# Patient Record
Sex: FEMALE | Employment: UNEMPLOYED | URBAN - METROPOLITAN AREA
[De-identification: names, ages, dates, MRNs, and addresses within clinical notes are randomized per-mention and may not be internally consistent; named-entity substitution may affect disease eponyms.]

---

## 2017-02-09 ENCOUNTER — OFFICE VISIT (OUTPATIENT)
Dept: INTERNAL MEDICINE CLINIC | Age: 77
End: 2017-02-09

## 2017-02-09 VITALS
HEART RATE: 64 BPM | HEIGHT: 62 IN | SYSTOLIC BLOOD PRESSURE: 128 MMHG | DIASTOLIC BLOOD PRESSURE: 64 MMHG | WEIGHT: 128.2 LBS | BODY MASS INDEX: 23.59 KG/M2

## 2017-02-09 DIAGNOSIS — Z00.00 ROUTINE GENERAL MEDICAL EXAMINATION AT A HEALTH CARE FACILITY: ICD-10-CM

## 2017-02-09 DIAGNOSIS — Z01.89 PATIENT REQUEST FOR DIAGNOSTIC TESTING: ICD-10-CM

## 2017-02-09 DIAGNOSIS — Z00.00 WELLNESS EXAMINATION: Primary | ICD-10-CM

## 2017-02-09 DIAGNOSIS — Z78.0 ASYMPTOMATIC MENOPAUSAL STATE: ICD-10-CM

## 2017-02-09 LAB
A/G RATIO: 1.9 (ref 1.1–2.2)
ALBUMIN SERPL-MCNC: 4.3 G/DL (ref 3.4–5)
ALP BLD-CCNC: 93 U/L (ref 40–129)
ALT SERPL-CCNC: 21 U/L (ref 10–40)
ANION GAP SERPL CALCULATED.3IONS-SCNC: 12 MMOL/L (ref 3–16)
AST SERPL-CCNC: 22 U/L (ref 15–37)
BILIRUB SERPL-MCNC: 0.4 MG/DL (ref 0–1)
BUN BLDV-MCNC: 21 MG/DL (ref 7–20)
CALCIUM SERPL-MCNC: 9.6 MG/DL (ref 8.3–10.6)
CHLORIDE BLD-SCNC: 104 MMOL/L (ref 99–110)
CO2: 26 MMOL/L (ref 21–32)
CREAT SERPL-MCNC: 0.7 MG/DL (ref 0.6–1.2)
GFR AFRICAN AMERICAN: >60
GFR NON-AFRICAN AMERICAN: >60
GLOBULIN: 2.3 G/DL
GLUCOSE BLD-MCNC: 104 MG/DL (ref 70–99)
HCT VFR BLD CALC: 42.9 % (ref 36–48)
HEMOGLOBIN: 14.1 G/DL (ref 12–16)
MCH RBC QN AUTO: 33.9 PG (ref 26–34)
MCHC RBC AUTO-ENTMCNC: 32.8 G/DL (ref 31–36)
MCV RBC AUTO: 103.2 FL (ref 80–100)
PDW BLD-RTO: 13.3 % (ref 12.4–15.4)
PLATELET # BLD: 211 K/UL (ref 135–450)
PMV BLD AUTO: 7.2 FL (ref 5–10.5)
POTASSIUM SERPL-SCNC: 4.3 MMOL/L (ref 3.5–5.1)
RBC # BLD: 4.16 M/UL (ref 4–5.2)
SODIUM BLD-SCNC: 142 MMOL/L (ref 136–145)
TOTAL PROTEIN: 6.6 G/DL (ref 6.4–8.2)
VITAMIN D 25-HYDROXY: 58.2 NG/ML
WBC # BLD: 4.1 K/UL (ref 4–11)

## 2017-02-09 PROCEDURE — G0438 PPPS, INITIAL VISIT: HCPCS | Performed by: INTERNAL MEDICINE

## 2017-02-09 ASSESSMENT — LIFESTYLE VARIABLES
HOW MANY STANDARD DRINKS CONTAINING ALCOHOL DO YOU HAVE ON A TYPICAL DAY: 0
HOW OFTEN DO YOU HAVE SIX OR MORE DRINKS ON ONE OCCASION: 0
AUDIT-C TOTAL SCORE: 1
HOW OFTEN DO YOU HAVE A DRINK CONTAINING ALCOHOL: 1

## 2017-02-09 ASSESSMENT — ANXIETY QUESTIONNAIRES: GAD7 TOTAL SCORE: 0

## 2017-02-09 ASSESSMENT — PATIENT HEALTH QUESTIONNAIRE - PHQ9: SUM OF ALL RESPONSES TO PHQ QUESTIONS 1-9: 0

## 2017-02-15 ENCOUNTER — HOSPITAL ENCOUNTER (OUTPATIENT)
Dept: GENERAL RADIOLOGY | Age: 77
Discharge: HOME | End: 2017-02-15
Attending: INTERNAL MEDICINE | Admitting: INTERNAL MEDICINE

## 2017-02-15 DIAGNOSIS — Z78.0 ASYMPTOMATIC MENOPAUSAL STATE: ICD-10-CM

## 2018-01-23 ENCOUNTER — OFFICE VISIT (OUTPATIENT)
Dept: INTERNAL MEDICINE CLINIC | Age: 78
End: 2018-01-23

## 2018-01-23 VITALS
SYSTOLIC BLOOD PRESSURE: 124 MMHG | WEIGHT: 122 LBS | HEART RATE: 68 BPM | HEIGHT: 62 IN | BODY MASS INDEX: 22.45 KG/M2 | TEMPERATURE: 97.7 F | DIASTOLIC BLOOD PRESSURE: 76 MMHG

## 2018-01-23 DIAGNOSIS — R21 RASH: ICD-10-CM

## 2018-01-23 DIAGNOSIS — R10.32 LEFT GROIN PAIN: Primary | ICD-10-CM

## 2018-01-23 LAB
BUN BLDV-MCNC: 14 MG/DL (ref 7–20)
CREAT SERPL-MCNC: 0.8 MG/DL (ref 0.6–1.2)
GFR AFRICAN AMERICAN: >60
GFR NON-AFRICAN AMERICAN: >60

## 2018-01-23 PROCEDURE — 1036F TOBACCO NON-USER: CPT | Performed by: INTERNAL MEDICINE

## 2018-01-23 PROCEDURE — 1123F ACP DISCUSS/DSCN MKR DOCD: CPT | Performed by: INTERNAL MEDICINE

## 2018-01-23 PROCEDURE — 1090F PRES/ABSN URINE INCON ASSESS: CPT | Performed by: INTERNAL MEDICINE

## 2018-01-23 PROCEDURE — G8484 FLU IMMUNIZE NO ADMIN: HCPCS | Performed by: INTERNAL MEDICINE

## 2018-01-23 PROCEDURE — 4040F PNEUMOC VAC/ADMIN/RCVD: CPT | Performed by: INTERNAL MEDICINE

## 2018-01-23 PROCEDURE — G8399 PT W/DXA RESULTS DOCUMENT: HCPCS | Performed by: INTERNAL MEDICINE

## 2018-01-23 PROCEDURE — 99214 OFFICE O/P EST MOD 30 MIN: CPT | Performed by: INTERNAL MEDICINE

## 2018-01-23 PROCEDURE — G8427 DOCREV CUR MEDS BY ELIG CLIN: HCPCS | Performed by: INTERNAL MEDICINE

## 2018-01-23 PROCEDURE — G8420 CALC BMI NORM PARAMETERS: HCPCS | Performed by: INTERNAL MEDICINE

## 2018-01-23 NOTE — PROGRESS NOTES
accurate and complete. I agree with the Chief Complaint, ROS, and Past Histories independently gathered by the clinical support staff and the remaining scribed note accurately describes my personal service to the patient.     1/23/2018    11:41 AM

## 2018-01-26 ENCOUNTER — TELEPHONE (OUTPATIENT)
Dept: INTERNAL MEDICINE CLINIC | Age: 78
End: 2018-01-26

## 2018-01-26 ENCOUNTER — HOSPITAL ENCOUNTER (OUTPATIENT)
Dept: CT IMAGING | Age: 78
Discharge: HOME | End: 2018-01-26
Attending: INTERNAL MEDICINE | Admitting: INTERNAL MEDICINE

## 2018-01-26 DIAGNOSIS — R10.32 LEFT GROIN PAIN: ICD-10-CM

## 2018-01-26 DIAGNOSIS — R10.32 LEFT LOWER QUADRANT PAIN: ICD-10-CM

## 2018-01-26 DIAGNOSIS — K41.90 FEMORAL HERNIA OF LEFT SIDE: Primary | ICD-10-CM

## 2018-02-05 ENCOUNTER — OFFICE VISIT (OUTPATIENT)
Dept: SURGERY | Age: 78
End: 2018-02-05

## 2018-02-05 VITALS
BODY MASS INDEX: 22.5 KG/M2 | WEIGHT: 127 LBS | HEIGHT: 63 IN | DIASTOLIC BLOOD PRESSURE: 70 MMHG | SYSTOLIC BLOOD PRESSURE: 120 MMHG

## 2018-02-05 DIAGNOSIS — K41.90 FEMORAL HERNIA OF LEFT SIDE: Primary | ICD-10-CM

## 2018-02-05 PROCEDURE — G8420 CALC BMI NORM PARAMETERS: HCPCS | Performed by: SURGERY

## 2018-02-05 PROCEDURE — 99203 OFFICE O/P NEW LOW 30 MIN: CPT | Performed by: SURGERY

## 2018-02-05 PROCEDURE — G8427 DOCREV CUR MEDS BY ELIG CLIN: HCPCS | Performed by: SURGERY

## 2018-02-05 PROCEDURE — 4040F PNEUMOC VAC/ADMIN/RCVD: CPT | Performed by: SURGERY

## 2018-02-05 PROCEDURE — G8484 FLU IMMUNIZE NO ADMIN: HCPCS | Performed by: SURGERY

## 2018-02-05 PROCEDURE — 1090F PRES/ABSN URINE INCON ASSESS: CPT | Performed by: SURGERY

## 2018-02-05 ASSESSMENT — ENCOUNTER SYMPTOMS
SINUS PRESSURE: 1
GASTROINTESTINAL NEGATIVE: 1
ALLERGIC/IMMUNOLOGIC NEGATIVE: 1
RESPIRATORY NEGATIVE: 1

## 2018-02-27 ENCOUNTER — HOSPITAL ENCOUNTER (OUTPATIENT)
Dept: SURGERY | Age: 78
Discharge: HOME | End: 2018-02-27
Admitting: SURGERY

## 2018-02-27 VITALS
WEIGHT: 123.9 LBS | BODY MASS INDEX: 21.95 KG/M2 | HEART RATE: 61 BPM | HEIGHT: 63 IN | TEMPERATURE: 96.8 F | SYSTOLIC BLOOD PRESSURE: 140 MMHG | OXYGEN SATURATION: 94 % | RESPIRATION RATE: 16 BRPM | DIASTOLIC BLOOD PRESSURE: 85 MMHG

## 2018-02-27 DIAGNOSIS — K41.90 NON-RECURRENT UNILATERAL FEMORAL HERNIA WITHOUT OBSTRUCTION OR GANGRENE: Primary | ICD-10-CM

## 2018-02-27 PROCEDURE — 49553 RPR FEM HERNIA INIT BLOCKED: CPT | Performed by: SURGERY

## 2018-02-27 PROCEDURE — 93010 ELECTROCARDIOGRAM REPORT: CPT | Performed by: INTERNAL MEDICINE

## 2018-02-27 RX ORDER — HYDROMORPHONE HCL 110MG/55ML
0.25 PATIENT CONTROLLED ANALGESIA SYRINGE INTRAVENOUS EVERY 5 MIN PRN
Status: DISCONTINUED | OUTPATIENT
Start: 2018-02-27 | End: 2018-02-28 | Stop reason: HOSPADM

## 2018-02-27 RX ORDER — CEFAZOLIN SODIUM 2 G/100ML
2 INJECTION, SOLUTION INTRAVENOUS ONCE
Status: COMPLETED | OUTPATIENT
Start: 2018-02-27 | End: 2018-02-27

## 2018-02-27 RX ORDER — SODIUM CHLORIDE 9 MG/ML
INJECTION, SOLUTION INTRAVENOUS CONTINUOUS
Status: DISCONTINUED | OUTPATIENT
Start: 2018-02-27 | End: 2018-02-28 | Stop reason: HOSPADM

## 2018-02-27 RX ORDER — HYDROCODONE BITARTRATE AND ACETAMINOPHEN 5; 325 MG/1; MG/1
1 TABLET ORAL EVERY 6 HOURS PRN
Qty: 20 TABLET | Refills: 0 | Status: SHIPPED | OUTPATIENT
Start: 2018-02-27 | End: 2018-03-04

## 2018-02-27 RX ORDER — HYDROCODONE BITARTRATE AND ACETAMINOPHEN 5; 325 MG/1; MG/1
1 TABLET ORAL
Status: ACTIVE | OUTPATIENT
Start: 2018-02-27 | End: 2018-02-27

## 2018-02-27 RX ORDER — SODIUM CHLORIDE 9 MG/ML
INJECTION, SOLUTION INTRAVENOUS
Status: DISCONTINUED
Start: 2018-02-27 | End: 2018-02-28 | Stop reason: HOSPADM

## 2018-02-27 RX ORDER — LIDOCAINE HYDROCHLORIDE 10 MG/ML
1 INJECTION, SOLUTION EPIDURAL; INFILTRATION; INTRACAUDAL; PERINEURAL
Status: ACTIVE | OUTPATIENT
Start: 2018-02-27 | End: 2018-02-27

## 2018-02-27 RX ORDER — ONDANSETRON 2 MG/ML
4 INJECTION INTRAMUSCULAR; INTRAVENOUS
Status: ACTIVE | OUTPATIENT
Start: 2018-02-27 | End: 2018-02-27

## 2018-02-27 RX ORDER — CEFAZOLIN SODIUM 2 G/100ML
INJECTION, SOLUTION INTRAVENOUS
Status: COMPLETED
Start: 2018-02-27 | End: 2018-02-27

## 2018-02-27 RX ADMIN — CEFAZOLIN SODIUM 2 G: 2 INJECTION, SOLUTION INTRAVENOUS at 13:51

## 2018-02-27 ASSESSMENT — PAIN SCALES - GENERAL
PAINLEVEL_OUTOF10: 0
PAINLEVEL_OUTOF10: 0
PAINLEVEL_OUTOF10: 4
PAINLEVEL_OUTOF10: 0

## 2018-02-27 ASSESSMENT — PAIN DESCRIPTION - ORIENTATION: ORIENTATION: LOWER

## 2018-02-27 ASSESSMENT — PAIN DESCRIPTION - PAIN TYPE: TYPE: SURGICAL PAIN

## 2018-02-27 ASSESSMENT — PAIN - FUNCTIONAL ASSESSMENT: PAIN_FUNCTIONAL_ASSESSMENT: 0-10

## 2018-02-27 ASSESSMENT — PAIN DESCRIPTION - LOCATION: LOCATION: ABDOMEN

## 2018-02-27 NOTE — PROGRESS NOTES
Teaching / education initiated regarding perioperative experience, expectations, and pain management during stay. Patient verbalized understanding.  Michael Acosta

## 2018-02-27 NOTE — H&P
[facility] and Laparoscopic Surgery      I have reviewed the history and physical and examined the patient and find no relevant changes. I have reviewed with the patient and/or family the risks, benefits, and alternatives to the procedure.     Rehan Sweet MD  2/27/2018

## 2018-02-27 NOTE — PROGRESS NOTES
Pt arrived to PACU from OR in stable condition and is alert. Pt can mvoe extremities to command. Respirations are even on 3L O2 per NC. Skin warm, dry, and with appropriate for ethnicity color. Abd is soft. Pain is tolerable at this time. One left femoral surgical site(s) intact with dressing= Skin Affix surgical glue, well approximated, open to air. Will continue to monitor for safety and comfort.     S/P: open left femoral hernia repair with mesh, Dr. Eaton Buys at [address] [place] [address] as outpt    Darby Antonio RN, BSN, [facility]  Pre-Op/Recovery   Same Day Surgery

## 2018-02-27 NOTE — PROGRESS NOTES
Pt arrived from pacu in stable condition. VSS. Pt alert and oriented. Pt denies any nausea at this time. Pt states surgical pain is a 4/10, pt states it is tolerated. Pt denies any need for PO pain medication. Incision clean dry and intact. PO fluids provided. Will continue to monitor. Call light in reach.

## 2018-02-28 LAB
EKG ATRIAL RATE: 63 BPM
EKG DIAGNOSIS: NORMAL
EKG P AXIS: 3 DEGREES
EKG P-R INTERVAL: 170 MS
EKG Q-T INTERVAL: 442 MS
EKG QRS DURATION: 112 MS
EKG QTC CALCULATION (BAZETT): 452 MS
EKG R AXIS: -32 DEGREES
EKG T AXIS: 40 DEGREES
EKG VENTRICULAR RATE: 63 BPM

## 2018-03-14 ENCOUNTER — OFFICE VISIT (OUTPATIENT)
Dept: SURGERY | Age: 78
End: 2018-03-14

## 2018-03-14 VITALS — DIASTOLIC BLOOD PRESSURE: 62 MMHG | SYSTOLIC BLOOD PRESSURE: 102 MMHG

## 2018-03-14 DIAGNOSIS — K41.90 FEMORAL HERNIA OF LEFT SIDE: Primary | ICD-10-CM

## 2018-03-14 PROCEDURE — 99024 POSTOP FOLLOW-UP VISIT: CPT | Performed by: SURGERY

## 2018-06-18 ENCOUNTER — TELEPHONE (OUTPATIENT)
Dept: INTERNAL MEDICINE CLINIC | Age: 78
End: 2018-06-18

## 2018-06-21 ENCOUNTER — TELEPHONE (OUTPATIENT)
Dept: INTERNAL MEDICINE CLINIC | Age: 78
End: 2018-06-21

## 2018-06-21 DIAGNOSIS — R19.7 DIARRHEA, UNSPECIFIED TYPE: Primary | ICD-10-CM

## 2018-06-23 DIAGNOSIS — A04.5 INTESTINAL INFECTION DUE TO CAMPYLOBACTER JEJUNI: Primary | ICD-10-CM

## 2018-06-23 LAB
GI BACTERIAL PATHOGENS BY PCR: ABNORMAL
GI BACTERIAL PATHOGENS BY PCR: ABNORMAL
ORGANISM: ABNORMAL

## 2018-06-23 RX ORDER — AZITHROMYCIN 500 MG/1
500 TABLET, FILM COATED ORAL DAILY
Qty: 1 PACKET | Refills: 0 | Status: SHIPPED | OUTPATIENT
Start: 2018-06-23 | End: 2018-06-26

## 2018-06-27 ENCOUNTER — TELEPHONE (OUTPATIENT)
Dept: INTERNAL MEDICINE CLINIC | Age: 78
End: 2018-06-27

## 2018-06-28 ENCOUNTER — HOSPITAL ENCOUNTER (OUTPATIENT)
Dept: OTHER | Age: 78
Discharge: HOME | End: 2018-06-28
Attending: INTERNAL MEDICINE | Admitting: INTERNAL MEDICINE

## 2018-12-27 ENCOUNTER — TELEPHONE (OUTPATIENT)
Dept: INTERNAL MEDICINE CLINIC | Age: 78
End: 2018-12-27

## 2018-12-27 DIAGNOSIS — E78.00 HYPERCHOLESTEROLEMIA: ICD-10-CM

## 2018-12-27 DIAGNOSIS — Z00.00 ROUTINE CHECK-UP: Primary | ICD-10-CM

## 2019-01-10 ENCOUNTER — OFFICE VISIT (OUTPATIENT)
Dept: INTERNAL MEDICINE CLINIC | Age: 79
End: 2019-01-10
Payer: MEDICARE

## 2019-01-10 VITALS
SYSTOLIC BLOOD PRESSURE: 130 MMHG | BODY MASS INDEX: 23.28 KG/M2 | HEART RATE: 64 BPM | DIASTOLIC BLOOD PRESSURE: 74 MMHG | WEIGHT: 131.4 LBS | HEIGHT: 63 IN

## 2019-01-10 DIAGNOSIS — M25.551 RIGHT HIP PAIN: ICD-10-CM

## 2019-01-10 DIAGNOSIS — Z00.00 ROUTINE GENERAL MEDICAL EXAMINATION AT A HEALTH CARE FACILITY: Primary | ICD-10-CM

## 2019-01-10 LAB
ALBUMIN SERPL-MCNC: 4.5 G/DL (ref 3.4–5)
ANION GAP SERPL CALCULATED.3IONS-SCNC: 14 MMOL/L (ref 3–16)
BUN BLDV-MCNC: 25 MG/DL (ref 7–20)
CALCIUM SERPL-MCNC: 9.8 MG/DL (ref 8.3–10.6)
CHLORIDE BLD-SCNC: 102 MMOL/L (ref 99–110)
CHOLESTEROL, TOTAL: 289 MG/DL (ref 0–199)
CO2: 25 MMOL/L (ref 21–32)
CREAT SERPL-MCNC: 0.8 MG/DL (ref 0.6–1.2)
GFR AFRICAN AMERICAN: >60
GFR NON-AFRICAN AMERICAN: >60
GLUCOSE BLD-MCNC: 97 MG/DL (ref 70–99)
HDLC SERPL-MCNC: 101 MG/DL (ref 40–60)
LDL CHOLESTEROL CALCULATED: 177 MG/DL
PHOSPHORUS: 3.9 MG/DL (ref 2.5–4.9)
POTASSIUM SERPL-SCNC: 4.8 MMOL/L (ref 3.5–5.1)
SODIUM BLD-SCNC: 141 MMOL/L (ref 136–145)
TRIGL SERPL-MCNC: 57 MG/DL (ref 0–150)
VLDLC SERPL CALC-MCNC: 11 MG/DL

## 2019-01-10 PROCEDURE — G8484 FLU IMMUNIZE NO ADMIN: HCPCS | Performed by: INTERNAL MEDICINE

## 2019-01-10 PROCEDURE — G0439 PPPS, SUBSEQ VISIT: HCPCS | Performed by: INTERNAL MEDICINE

## 2019-01-10 PROCEDURE — 4040F PNEUMOC VAC/ADMIN/RCVD: CPT | Performed by: INTERNAL MEDICINE

## 2019-01-10 ASSESSMENT — LIFESTYLE VARIABLES
AUDIT-C TOTAL SCORE: 1
HOW OFTEN DURING THE LAST YEAR HAVE YOU NEEDED AN ALCOHOLIC DRINK FIRST THING IN THE MORNING TO GET YOURSELF GOING AFTER A NIGHT OF HEAVY DRINKING: 0
HOW OFTEN DO YOU HAVE SIX OR MORE DRINKS ON ONE OCCASION: 0
HOW OFTEN DURING THE LAST YEAR HAVE YOU HAD A FEELING OF GUILT OR REMORSE AFTER DRINKING: 0
HOW OFTEN DURING THE LAST YEAR HAVE YOU FOUND THAT YOU WERE NOT ABLE TO STOP DRINKING ONCE YOU HAD STARTED: 0
HOW OFTEN DO YOU HAVE A DRINK CONTAINING ALCOHOL: 1
HOW MANY STANDARD DRINKS CONTAINING ALCOHOL DO YOU HAVE ON A TYPICAL DAY: 0
HAVE YOU OR SOMEONE ELSE BEEN INJURED AS A RESULT OF YOUR DRINKING: 0
HOW OFTEN DURING THE LAST YEAR HAVE YOU FAILED TO DO WHAT WAS NORMALLY EXPECTED FROM YOU BECAUSE OF DRINKING: 0
AUDIT TOTAL SCORE: 1
HAS A RELATIVE, FRIEND, DOCTOR, OR ANOTHER HEALTH PROFESSIONAL EXPRESSED CONCERN ABOUT YOUR DRINKING OR SUGGESTED YOU CUT DOWN: 0
HOW OFTEN DURING THE LAST YEAR HAVE YOU BEEN UNABLE TO REMEMBER WHAT HAPPENED THE NIGHT BEFORE BECAUSE YOU HAD BEEN DRINKING: 0

## 2019-01-10 ASSESSMENT — PATIENT HEALTH QUESTIONNAIRE - PHQ9
SUM OF ALL RESPONSES TO PHQ QUESTIONS 1-9: 0
SUM OF ALL RESPONSES TO PHQ QUESTIONS 1-9: 0

## 2019-01-10 ASSESSMENT — ANXIETY QUESTIONNAIRES: GAD7 TOTAL SCORE: 0

## 2019-01-17 ENCOUNTER — HOSPITAL ENCOUNTER (OUTPATIENT)
Dept: GENERAL RADIOLOGY | Age: 79
Discharge: HOME | End: 2019-01-17
Payer: MEDICARE

## 2019-01-17 ENCOUNTER — HOSPITAL ENCOUNTER (OUTPATIENT)
Age: 79
Discharge: HOME | End: 2019-01-17
Payer: MEDICARE

## 2019-01-17 DIAGNOSIS — R52 PAIN: ICD-10-CM

## 2019-01-17 PROCEDURE — 73521 X-RAY EXAM HIPS BI 2 VIEWS: CPT

## 2020-06-25 ENCOUNTER — TELEPHONE (OUTPATIENT)
Dept: INTERNAL MEDICINE CLINIC | Age: 80
End: 2020-06-25

## 2020-07-02 ENCOUNTER — OFFICE VISIT (OUTPATIENT)
Dept: INTERNAL MEDICINE CLINIC | Age: 80
End: 2020-07-02
Payer: MEDICARE

## 2020-07-02 VITALS
DIASTOLIC BLOOD PRESSURE: 60 MMHG | BODY MASS INDEX: 24.13 KG/M2 | HEIGHT: 63 IN | TEMPERATURE: 97.5 F | SYSTOLIC BLOOD PRESSURE: 130 MMHG | HEART RATE: 76 BPM | WEIGHT: 136.2 LBS

## 2020-07-02 PROCEDURE — G0439 PPPS, SUBSEQ VISIT: HCPCS | Performed by: NURSE PRACTITIONER

## 2020-07-02 PROCEDURE — 1123F ACP DISCUSS/DSCN MKR DOCD: CPT | Performed by: NURSE PRACTITIONER

## 2020-07-02 PROCEDURE — 4040F PNEUMOC VAC/ADMIN/RCVD: CPT | Performed by: NURSE PRACTITIONER

## 2020-07-02 ASSESSMENT — LIFESTYLE VARIABLES
HOW OFTEN DO YOU HAVE SIX OR MORE DRINKS ON ONE OCCASION: 0
HOW OFTEN DURING THE LAST YEAR HAVE YOU HAD A FEELING OF GUILT OR REMORSE AFTER DRINKING: 0
AUDIT-C TOTAL SCORE: 2
HOW MANY STANDARD DRINKS CONTAINING ALCOHOL DO YOU HAVE ON A TYPICAL DAY: 0
HOW OFTEN DURING THE LAST YEAR HAVE YOU BEEN UNABLE TO REMEMBER WHAT HAPPENED THE NIGHT BEFORE BECAUSE YOU HAD BEEN DRINKING: 0
HOW OFTEN DURING THE LAST YEAR HAVE YOU NEEDED AN ALCOHOLIC DRINK FIRST THING IN THE MORNING TO GET YOURSELF GOING AFTER A NIGHT OF HEAVY DRINKING: 0
AUDIT TOTAL SCORE: 2
HAVE YOU OR SOMEONE ELSE BEEN INJURED AS A RESULT OF YOUR DRINKING: 0
HOW OFTEN DO YOU HAVE A DRINK CONTAINING ALCOHOL: 2
HAS A RELATIVE, FRIEND, DOCTOR, OR ANOTHER HEALTH PROFESSIONAL EXPRESSED CONCERN ABOUT YOUR DRINKING OR SUGGESTED YOU CUT DOWN: 0
HOW OFTEN DURING THE LAST YEAR HAVE YOU FAILED TO DO WHAT WAS NORMALLY EXPECTED FROM YOU BECAUSE OF DRINKING: 0
HOW OFTEN DURING THE LAST YEAR HAVE YOU FOUND THAT YOU WERE NOT ABLE TO STOP DRINKING ONCE YOU HAD STARTED: 0

## 2020-07-02 ASSESSMENT — PATIENT HEALTH QUESTIONNAIRE - PHQ9
SUM OF ALL RESPONSES TO PHQ QUESTIONS 1-9: 0
SUM OF ALL RESPONSES TO PHQ QUESTIONS 1-9: 0

## 2020-07-02 NOTE — PROGRESS NOTES
Medicare Annual Wellness Visit  Name: Mandeep Ruff Date: 2020   MRN: [id number] Sex: Female   Age: 79 y.o. Ethnicity: Non-/Non    : 1940 Race: Declined      Sean Mera is here for Medicare AWV    Screenings for behavioral, psychosocial and functional/safety risks, and cognitive dysfunction are all negative except as indicated below. These results, as well as other patient data from the [email or web address] form, are documented in Flowsheets linked to this Encounter. Allergies   Allergen Reactions    Statins      Myalgia           Prior to Visit Medications    Medication Sig Taking? Authorizing Provider   triamcinolone (KENALOG) 0.1 % ointment Apply topically 2 times daily for 7 days Yes Vickii Laly Bryon, APRN - CNP   MAGNESIUM-POTASSIUM PO Take by mouth daily Yes Historical Provider, MD   Multiple Vitamins-Minerals (MULTIVITAMIN WOMEN 50+ PO) Take 1 capsule by mouth daily Yes Historical Provider, MD   Ascorbic Acid (VITAMIN C) 500 MG tablet Take 500 mg by mouth daily. Yes Historical Provider, MD   VITAMIN D, CHOLECALCIFEROL, PO Take 5,000 Units by mouth. Yes Historical Provider, MD         Past Medical History:   Diagnosis Date    Campylobacter diarrhea 2018    Cataract     Hyperlipidemia     Injected eye, right     every 4 wks.         Past Surgical History:   Procedure Laterality Date    CATARACT REMOVAL      FEMORAL HERNIA REPAIR Left 2018    with mesh with Dr. Bruna Patel as out pt at [facility]         Family History   Problem Relation Age of Onset    Heart Disease Father     Cancer Father     Stroke Mother     Rheum Arthritis Mother     Arthritis Mother     Cancer Sister     Rheum Arthritis Brother        CareTeam (Including outside providers/suppliers regularly involved in providing care):   Patient Care Team:  Parvez Echevarria MD as PCP - General (Internal Medicine)  Parvez Echevarria MD as PCP [facility] Provider  Mt Garcia MD as Consulting Physician (General Surgery)    Wt Readings from Last 3 Encounters:   07/02/20 136 lb 3.2 oz (61.8 kg)   01/10/19 131 lb 6.4 oz (59.6 kg)   02/27/18 123 lb 14.4 oz (56.2 kg)     Vitals:    07/02/20 0949   BP: 130/60   Pulse: 76   Temp: 97.5 °F (36.4 °C)   Weight: 136 lb 3.2 oz (61.8 kg)   Height: 5' 3\" (1.6 m)     Body mass index is 24.13 kg/m². Based upon direct observation of the patient, evaluation of cognition reveals recent and remote memory intact. Patient's complete Health Risk Assessment and screening values have been reviewed and are found in Flowsheets. The following problems were reviewed today and where indicated follow up appointments were made and/or referrals ordered. Physical Exam  Constitutional:       General: She is not in acute distress. Appearance: Normal appearance. She is not ill-appearing. HENT:      Head: Normocephalic and atraumatic. Cardiovascular:      Rate and Rhythm: Normal rate and regular rhythm. Heart sounds: Normal heart sounds. Pulmonary:      Effort: Pulmonary effort is normal. No respiratory distress. Breath sounds: Normal breath sounds. Musculoskeletal:      Comments: Gait steady with cane    Neurological:      Mental Status: She is alert and oriented to person, place, and time. Psychiatric:         Mood and Affect: Mood normal.         Behavior: Behavior normal.         Positive Risk Factor Screenings with Interventions:     General Health:  General  In general, how would you say your health is?: Very Good  In the past 7 days, have you experienced any of the following?  New or Increased Pain, New or Increased Fatigue, Loneliness, Social Isolation, Stress or Anger?: (!) Stress, Anger  Do you get the social and emotional support that you need?: Yes  Do you have a Living Will?: Yes  General Health Risk Interventions:  · Stress: patient declines any further evaluation/treatment for this issue    Health Habits/Nutrition:  Health Habits/Nutrition  Do you exercise for at least 20 minutes 2-3 times per week?: (!) No(hip problem)  Have you lost any weight without trying in the past 3 months?: No  Do you eat fewer than 2 meals per day?: No  Have you seen a dentist within the past year?: Yes  Body mass index is 24.13 kg/m². Health Habits/Nutrition Interventions:  · Inadequate physical activity:  due to hip pain    Personalized Preventive Plan   Current Health Maintenance Status  Immunization History   Administered Date(s) Administered    Pneumococcal Polysaccharide ([id number]) 01/26/2006, 10/08/2010    Td, unspecified formulation 01/13/2004    Tdap (Boostrix, Adacel) 03/08/2012, 04/19/2012    Tetanus 01/13/2004      Health Maintenance   Topic Date Due    Shingles Vaccine (1 of 2) 05/29/1989    Annual Wellness Visit (AWV)  05/29/2019    Flu vaccine (1) 09/01/2020    DTaP/Tdap/Td vaccine (3 - Td) 04/19/2022    Pneumococcal 65+ years Vaccine  Completed    DEXA (modify frequency per FRAX score)  Addressed    Hepatitis A vaccine  Aged Out    Hepatitis B vaccine  Aged Out    Hib vaccine  Aged Out    Meningococcal (ACWY) vaccine  Aged Out     Recommendations for [place] Due: see orders and patient instructions/AVS.  . Recommended screening schedule for the next 5-10 years is provided to the patient in written form: see Patient Leo Zazueta was seen today for medicare awv. Diagnoses and all orders for this visit:    Routine general medical examination at a health care facility  -     Lipid Panel; Future  -     Basic Metabolic Panel;  Future    Primary osteoarthritis of both hips  -     External Referral To Orthopedic Surgery  Worsening pain   Is going to PT and exercise / stretches - which helps some   Pt reports she will need replacement - wants to see ortho to discuss       Primary osteoarthritis of right hip  -     External Referral To Orthopedic Surgery    Electronically signed by LAW Gallego - CNP on 7/2/2020 at 10:14 AM

## 2020-07-02 NOTE — PATIENT INSTRUCTIONS
Personalized Preventive Plan for Sophia Santiago - 7/2/2020  Medicare offers a range of preventive health benefits. Some of the tests and screenings are paid in full while other may be subject to a deductible, co-insurance, and/or copay. Some of these benefits include a comprehensive review of your medical history including lifestyle, illnesses that may run in your family, and various assessments and screenings as appropriate. After reviewing your medical record and screening and assessments performed today your provider may have ordered immunizations, labs, imaging, and/or referrals for you. A list of these orders (if applicable) as well as your Preventive Care list are included within your After Visit Summary for your review. Other Preventive Recommendations:    · A preventive eye exam performed by an eye specialist is recommended every 1-2 years to screen for glaucoma; cataracts, macular degeneration, and other eye disorders. · A preventive dental visit is recommended every 6 months. · Try to get at least 150 minutes of exercise per week or 10,000 steps per day on a pedometer . · Order or download the FREE \"Exercise & Physical Activity: Your Everyday Guide\" from [employer]. Call 3-807.185.9493 or search [employer] online. · You need 7446-7834 mg of calcium and 4025-9082 IU of vitamin D per day. It is possible to meet your calcium requirement with diet alone, but a vitamin D supplement is usually necessary to meet this goal.  · When exposed to the sun, use a sunscreen that protects against both UVA and UVB radiation with an SPF of 30 or greater. Reapply every 2 to 3 hours or after sweating, drying off with a towel, or swimming. · Always wear a seat belt when traveling in a car. Always wear a helmet when riding a bicycle or motorcycle.

## 2020-07-16 DIAGNOSIS — Z00.00 ROUTINE GENERAL MEDICAL EXAMINATION AT A HEALTH CARE FACILITY: ICD-10-CM

## 2020-07-16 LAB
ANION GAP SERPL CALCULATED.3IONS-SCNC: 11 MMOL/L (ref 3–16)
BUN BLDV-MCNC: 25 MG/DL (ref 7–20)
CALCIUM SERPL-MCNC: 10.3 MG/DL (ref 8.3–10.6)
CHLORIDE BLD-SCNC: 103 MMOL/L (ref 99–110)
CHOLESTEROL, TOTAL: 268 MG/DL (ref 0–199)
CO2: 27 MMOL/L (ref 21–32)
CREAT SERPL-MCNC: 0.8 MG/DL (ref 0.6–1.2)
GFR AFRICAN AMERICAN: >60
GFR NON-AFRICAN AMERICAN: >60
GLUCOSE BLD-MCNC: 105 MG/DL (ref 70–99)
HDLC SERPL-MCNC: 102 MG/DL (ref 40–60)
LDL CHOLESTEROL CALCULATED: 149 MG/DL
POTASSIUM SERPL-SCNC: 5.1 MMOL/L (ref 3.5–5.1)
SODIUM BLD-SCNC: 141 MMOL/L (ref 136–145)
TRIGL SERPL-MCNC: 87 MG/DL (ref 0–150)
VLDLC SERPL CALC-MCNC: 17 MG/DL

## 2020-08-10 ENCOUNTER — OFFICE VISIT (OUTPATIENT)
Dept: INTERNAL MEDICINE CLINIC | Age: 80
End: 2020-08-10
Payer: MEDICARE

## 2020-08-10 VITALS
BODY MASS INDEX: 23.88 KG/M2 | OXYGEN SATURATION: 98 % | DIASTOLIC BLOOD PRESSURE: 68 MMHG | HEART RATE: 68 BPM | SYSTOLIC BLOOD PRESSURE: 120 MMHG | TEMPERATURE: 97.2 F | WEIGHT: 134.8 LBS

## 2020-08-10 DIAGNOSIS — E53.8 B12 DEFICIENCY: ICD-10-CM

## 2020-08-10 DIAGNOSIS — Z01.818 PREOP EXAMINATION: ICD-10-CM

## 2020-08-10 LAB
ANION GAP SERPL CALCULATED.3IONS-SCNC: 11 MMOL/L (ref 3–16)
APTT: 36.5 SEC (ref 24.2–36.2)
BASOPHILS ABSOLUTE: 0 K/UL (ref 0–0.2)
BASOPHILS RELATIVE PERCENT: 0.8 %
BUN BLDV-MCNC: 23 MG/DL (ref 7–20)
CALCIUM SERPL-MCNC: 9.8 MG/DL (ref 8.3–10.6)
CHLORIDE BLD-SCNC: 106 MMOL/L (ref 99–110)
CO2: 25 MMOL/L (ref 21–32)
CREAT SERPL-MCNC: 0.8 MG/DL (ref 0.6–1.2)
EOSINOPHILS ABSOLUTE: 0.2 K/UL (ref 0–0.6)
EOSINOPHILS RELATIVE PERCENT: 2.9 %
FOLATE: >20 NG/ML (ref 4.78–24.2)
GFR AFRICAN AMERICAN: >60
GFR NON-AFRICAN AMERICAN: >60
GLUCOSE BLD-MCNC: 107 MG/DL (ref 70–99)
HCT VFR BLD CALC: 40.4 % (ref 36–48)
HEMOGLOBIN: 13.4 G/DL (ref 12–16)
INR BLD: 0.92 (ref 0.86–1.14)
LYMPHOCYTES ABSOLUTE: 1.8 K/UL (ref 1–5.1)
LYMPHOCYTES RELATIVE PERCENT: 34.4 %
MCH RBC QN AUTO: 33.8 PG (ref 26–34)
MCHC RBC AUTO-ENTMCNC: 33.2 G/DL (ref 31–36)
MCV RBC AUTO: 101.7 FL (ref 80–100)
MONOCYTES ABSOLUTE: 0.5 K/UL (ref 0–1.3)
MONOCYTES RELATIVE PERCENT: 9.3 %
NEUTROPHILS ABSOLUTE: 2.8 K/UL (ref 1.7–7.7)
NEUTROPHILS RELATIVE PERCENT: 52.6 %
PDW BLD-RTO: 12.8 % (ref 12.4–15.4)
PLATELET # BLD: 249 K/UL (ref 135–450)
PMV BLD AUTO: 6.8 FL (ref 5–10.5)
POTASSIUM SERPL-SCNC: 4.3 MMOL/L (ref 3.5–5.1)
PROTHROMBIN TIME: 10.7 SEC (ref 10–13.2)
RBC # BLD: 3.97 M/UL (ref 4–5.2)
SODIUM BLD-SCNC: 142 MMOL/L (ref 136–145)
VITAMIN B-12: 608 PG/ML (ref 211–911)
WBC # BLD: 5.3 K/UL (ref 4–11)

## 2020-08-10 PROCEDURE — 1090F PRES/ABSN URINE INCON ASSESS: CPT | Performed by: NURSE PRACTITIONER

## 2020-08-10 PROCEDURE — 4040F PNEUMOC VAC/ADMIN/RCVD: CPT | Performed by: NURSE PRACTITIONER

## 2020-08-10 PROCEDURE — 1123F ACP DISCUSS/DSCN MKR DOCD: CPT | Performed by: NURSE PRACTITIONER

## 2020-08-10 PROCEDURE — 99214 OFFICE O/P EST MOD 30 MIN: CPT | Performed by: NURSE PRACTITIONER

## 2020-08-10 PROCEDURE — G8399 PT W/DXA RESULTS DOCUMENT: HCPCS | Performed by: NURSE PRACTITIONER

## 2020-08-10 PROCEDURE — 1036F TOBACCO NON-USER: CPT | Performed by: NURSE PRACTITIONER

## 2020-08-10 PROCEDURE — 93000 ELECTROCARDIOGRAM COMPLETE: CPT | Performed by: NURSE PRACTITIONER

## 2020-08-10 PROCEDURE — G8420 CALC BMI NORM PARAMETERS: HCPCS | Performed by: NURSE PRACTITIONER

## 2020-08-10 PROCEDURE — G8427 DOCREV CUR MEDS BY ELIG CLIN: HCPCS | Performed by: NURSE PRACTITIONER

## 2020-08-10 ASSESSMENT — ENCOUNTER SYMPTOMS
SHORTNESS OF BREATH: 0
ALLERGIC/IMMUNOLOGIC NEGATIVE: 1
WHEEZING: 0
GASTROINTESTINAL NEGATIVE: 1
COUGH: 0
EYES NEGATIVE: 1

## 2020-08-10 NOTE — PROGRESS NOTES
8/10/20     Chief Complaint   Patient presents with    Pre-op Exam     rt hip replacement,8/25, [facility]     HPI    Sigifredo Bhagat is a 79 y.o.  female who comes for a preoperative exam. She  is referred by Dr. Edmundo Mckeon for perioperative risk determination for upcoming surgery for a right total hip arthroplasty on 8/25/2020 at [facility].  Denies taking any asa, blood thinners, fish oil, NSAIDs or herbals. Denies any previous complications with anesthesia or prior surgeries. She does not smoke tobacco.     HLD - Previous total cholesterol from 7/16 was 268, HDL was 102 and LDL was 149. She states that her diet could be better, she is a vegetarian. She exercises at PT for her hip and keeps active with stretches/exercises at home as well. OA - Upcoming surgery on 8/25/2020 for right total hip replacement at [facility].    Allergies   Allergen Reactions    Statins      Myalgia       Current Outpatient Medications   Medication Sig Dispense Refill    MAGNESIUM-POTASSIUM PO Take by mouth daily      Multiple Vitamins-Minerals (MULTIVITAMIN WOMEN 50+ PO) Take 1 capsule by mouth daily      Ascorbic Acid (VITAMIN C) 500 MG tablet Take 500 mg by mouth daily.  VITAMIN D, CHOLECALCIFEROL, PO Take 5,000 Units by mouth. No current facility-administered medications for this visit. Review of Systems   Constitutional: Negative for chills and fever. HENT: Negative. Eyes: Negative. Respiratory: Negative for cough, shortness of breath and wheezing. Cardiovascular: Negative for chest pain, palpitations and leg swelling. Gastrointestinal: Negative. Endocrine: Negative. Genitourinary: Negative. Musculoskeletal: Positive for arthralgias (R hip pain). Skin: Negative. Allergic/Immunologic: Negative. Neurological: Negative. Hematological: Negative. Psychiatric/Behavioral: Negative.       Vitals:    08/10/20 0726   BP: 120/68   Pulse: 68   Temp: 97.2 °F (36.2 °C)   SpO2: 98%   Weight: 134 lb 12.8 oz (61.1 kg)      Physical Exam  Constitutional:       General: She is not in acute distress. Appearance: Normal appearance. She is not ill-appearing. HENT:      Head: Normocephalic and atraumatic. Neck:      Musculoskeletal: Normal range of motion. Cardiovascular:      Rate and Rhythm: Normal rate and regular rhythm. Heart sounds: Normal heart sounds. No murmur. Pulmonary:      Effort: Pulmonary effort is normal. No respiratory distress. Breath sounds: Normal breath sounds. No wheezing. Abdominal:      General: Bowel sounds are normal.      Palpations: Abdomen is soft. Musculoskeletal: Normal range of motion. Skin:     General: Skin is warm and dry. Neurological:      General: No focal deficit present. Mental Status: She is alert and oriented to person, place, and time. Psychiatric:         Mood and Affect: Mood normal.         Behavior: Behavior normal.       Assessment/Plan     1. Preop examination  Cleared for surgery, checking pre-op labs  Pt will have staph and covid swabs at [facility] - scheduled. DVT prophylaxis per surgery team   - Right total hip replacement on 8/25/2020 at [facility].  - EKG 12 lead - NSR without acute signs of ischemia or infarct   - CBC Auto Differential; Future  - Basic Metabolic Panel; Future  - Protime-INR; Future  - APTT; Future  - URINE RT REFLEX TO CULTURE  - Hemoglobin A1C; Future    2. Primary osteoarthritis of right hip  - Right total hip replacement on 8/25/2020 at [facility] with Dr. Allyn Chandra. 3. Mixed hyperlipidemia  Stable, checking labs    4. Osteopenia of multiple sites  Stable, controlled on current regimen. 5. B12 deficiency  Checking labs   - Vitamin B12 & Folate; Future  - Methylmalonic Acid, Serum; Future    Discussed medications with patient, who voiced understanding of their use and indications. All questions answered. Follow-up yearly and prn. Electronically signed by LAW Chowdhury CNP on 8/10/2020 at 8:20 AM

## 2020-08-11 LAB
ESTIMATED AVERAGE GLUCOSE: 125.5 MG/DL
HBA1C MFR BLD: 6 %

## 2020-08-13 LAB — METHYLMALONIC ACID: 0.19 UMOL/L (ref 0–0.4)

## 2021-06-01 ENCOUNTER — TELEPHONE (OUTPATIENT)
Dept: INTERNAL MEDICINE CLINIC | Age: 81
End: 2021-06-01

## 2021-06-01 DIAGNOSIS — R73.09 ABNORMAL GLUCOSE: Primary | ICD-10-CM

## 2021-06-01 DIAGNOSIS — E53.8 B12 DEFICIENCY: ICD-10-CM

## 2021-06-01 NOTE — TELEPHONE ENCOUNTER
----- Message from Reyna Beth sent at 6/1/2021  2:10 PM EDT -----  Subject: Referral Request    QUESTIONS   Reason for referral request? blood work for Preop and AWV   Has the physician seen you for this condition before? No   Preferred Specialist (if applicable)? Do you already have an appointment scheduled? Yes  Select Scheduled Date? 2021-06-15  Select Scheduled Physician? Bandar Garrido   Additional Information for Provider?   ---------------------------------------------------------------------------  --------------  Martha RIVERS  What is the best way for the office to contact you? OK to leave message on   voicemail  Preferred Call Back Phone Number?  [phone]

## 2021-06-01 NOTE — TELEPHONE ENCOUNTER
----- Message from Ana Trace sent at 6/1/2021  2:12 PM EDT -----  Subject: Message to Provider    QUESTIONS  Information for Provider? Patient informed that she has received her Covid   Vaccine and would like to update her Health Maintenance Record. She   received her Covid Vaccine at [facility] in [place], 1st shot was 3/30/2021   and 2nd was 4/20/2021 - [facility]  ---------------------------------------------------------------------------  --------------  Siria RIVERS  What is the best way for the office to contact you? OK to leave message on   voicemail  Preferred Call Back Phone Number? [phone]  ---------------------------------------------------------------------------  --------------  SCRIPT ANSWERS  Relationship to Patient?  Self

## 2021-06-15 ENCOUNTER — OFFICE VISIT (OUTPATIENT)
Dept: INTERNAL MEDICINE CLINIC | Age: 81
End: 2021-06-15
Payer: MEDICARE

## 2021-06-15 VITALS
HEIGHT: 63 IN | SYSTOLIC BLOOD PRESSURE: 122 MMHG | WEIGHT: 132.6 LBS | HEART RATE: 76 BPM | DIASTOLIC BLOOD PRESSURE: 66 MMHG | BODY MASS INDEX: 23.5 KG/M2

## 2021-06-15 DIAGNOSIS — Z01.818 PREOP EXAMINATION: ICD-10-CM

## 2021-06-15 DIAGNOSIS — E78.2 MIXED HYPERLIPIDEMIA: ICD-10-CM

## 2021-06-15 DIAGNOSIS — M16.12 ARTHRITIS OF LEFT HIP: Primary | ICD-10-CM

## 2021-06-15 PROCEDURE — 93000 ELECTROCARDIOGRAM COMPLETE: CPT | Performed by: INTERNAL MEDICINE

## 2021-06-15 PROCEDURE — 99214 OFFICE O/P EST MOD 30 MIN: CPT | Performed by: INTERNAL MEDICINE

## 2021-06-15 PROCEDURE — 1036F TOBACCO NON-USER: CPT | Performed by: INTERNAL MEDICINE

## 2021-06-15 PROCEDURE — 1123F ACP DISCUSS/DSCN MKR DOCD: CPT | Performed by: INTERNAL MEDICINE

## 2021-06-15 PROCEDURE — G8399 PT W/DXA RESULTS DOCUMENT: HCPCS | Performed by: INTERNAL MEDICINE

## 2021-06-15 PROCEDURE — 1090F PRES/ABSN URINE INCON ASSESS: CPT | Performed by: INTERNAL MEDICINE

## 2021-06-15 PROCEDURE — 4040F PNEUMOC VAC/ADMIN/RCVD: CPT | Performed by: INTERNAL MEDICINE

## 2021-06-15 PROCEDURE — G8427 DOCREV CUR MEDS BY ELIG CLIN: HCPCS | Performed by: INTERNAL MEDICINE

## 2021-06-15 PROCEDURE — G8420 CALC BMI NORM PARAMETERS: HCPCS | Performed by: INTERNAL MEDICINE

## 2021-06-15 SDOH — ECONOMIC STABILITY: FOOD INSECURITY: WITHIN THE PAST 12 MONTHS, YOU WORRIED THAT YOUR FOOD WOULD RUN OUT BEFORE YOU GOT MONEY TO BUY MORE.: NEVER TRUE

## 2021-06-15 SDOH — ECONOMIC STABILITY: FOOD INSECURITY: WITHIN THE PAST 12 MONTHS, THE FOOD YOU BOUGHT JUST DIDN'T LAST AND YOU DIDN'T HAVE MONEY TO GET MORE.: NEVER TRUE

## 2021-06-15 ASSESSMENT — PATIENT HEALTH QUESTIONNAIRE - PHQ9
SUM OF ALL RESPONSES TO PHQ QUESTIONS 1-9: 0
SUM OF ALL RESPONSES TO PHQ9 QUESTIONS 1 & 2: 0
1. LITTLE INTEREST OR PLEASURE IN DOING THINGS: 0
SUM OF ALL RESPONSES TO PHQ QUESTIONS 1-9: 0
SUM OF ALL RESPONSES TO PHQ QUESTIONS 1-9: 0
2. FEELING DOWN, DEPRESSED OR HOPELESS: 0

## 2021-06-15 ASSESSMENT — SOCIAL DETERMINANTS OF HEALTH (SDOH): HOW HARD IS IT FOR YOU TO PAY FOR THE VERY BASICS LIKE FOOD, HOUSING, MEDICAL CARE, AND HEATING?: NOT VERY HARD

## 2021-06-15 NOTE — PROGRESS NOTES
Chief Complaint   Patient presents with    Pre-op Exam     Left hip replacement by Dr. Chato Ngo on 7/6 @ [address] [facility]         HPI:  The patient is presenting for a medical preoperative assessment. Procedure: left hip TKA   Surgery is scheduled for 7/6  Indication for surgery: chronic left hip pain, refractory to PT, chiropractic care, exercise, steroid injections. Pain is severe. It is aggravated most at nighttime when laying in bed. Surgeon: Dr. Chato Ngo   Location of surgery: [address] history is notable for hyperlipidemia. She has been intolerant of statins in the past due to myalgia. Current Outpatient Medications   Medication Sig Dispense Refill    MAGNESIUM-POTASSIUM PO Take by mouth daily      Multiple Vitamins-Minerals (MULTIVITAMIN WOMEN 50+ PO) Take 1 capsule by mouth daily      Ascorbic Acid (VITAMIN C) 500 MG tablet Take 500 mg by mouth daily.  VITAMIN D, CHOLECALCIFEROL, PO Take 5,000 Units by mouth. Allergies   Allergen Reactions    Statins      Myalgia            Past Medical History:   Diagnosis Date    Campylobacter diarrhea 06/21/2018    Cataract     Hyperlipidemia     Injected eye, right     every 4 wks.          Past Surgical History:   Procedure Laterality Date    CATARACT REMOVAL      FEMORAL HERNIA REPAIR Left 02/27/2018    with mesh with Dr. Madison Rizvi as out pt at [facility]   [address] Right 2020      No significant complications of anesthesia    Social History     Tobacco Use    Smoking status: Never Smoker    Smokeless tobacco: Never Used   Vaping Use    Vaping Use: Never used   Substance Use Topics    Alcohol use: Yes     Comment: occasionally    Drug use: No        Family History   Problem Relation Age of Onset    Heart Disease Father     Cancer Father     Stroke Mother     Rheum Arthritis Mother     Arthritis Mother     Cancer Sister     Rheum Arthritis Brother            Review of Systems Musculoskeletal: Positive for arthralgias (left hip). All other systems reviewed and are negative. EXAM:  /66   Pulse 76   Ht 5' 3\" (1.6 m)   Wt 132 lb 9.6 oz (60.1 kg)   BMI 23.49 kg/m²    Gen: WN/WD, no acute distress  Head: normocephalic, atraumatic  Eyes: no icterus, no conjunctival erythema. Pupils reactive to light. ENT: Moist mucous membranes, normal appearing nose, normal appearing OP  Neck: supple, no masses  CV: regular rate and rhythm, no murmurs, rubs, gallops. Pedal pulses 2+, symmetric  Resp: Normal effort, clear to auscultation bilaterally  Abd: +Bowel Sounds, soft, non tender to palpation. MSK: no joint swelling, no cyanosis or clubbing  Skin: warm, dry, no rashes visualized  Neuro: Cranial Nerves intact, no focal motor deficits  Psych: normal mood and affect. Appropriately oriented. Lab Results   Component Value Date    CREATININE 0.8 08/10/2020    BUN 23 (H) 08/10/2020     08/10/2020    K 4.3 08/10/2020     08/10/2020    CO2 25 08/10/2020        Lab Results   Component Value Date    WBC 5.3 08/10/2020    HGB 13.4 08/10/2020    HCT 40.4 08/10/2020    .7 (H) 08/10/2020     08/10/2020        EKG: NSR, LAD, no ST T changes       A/P  1. Arthritis of left hip  With chronic pain, refractory to conservative measures. She is scheduled for surgery at [facility] on July 6. A medical preoperative assessment was completed today. There are no signs or symptoms suggestive of active cardiovascular disease. Her activity tolerance is greater than 4 metabolic equivalents. Preoperative laboratory testing has been ordered by her surgeon and will be obtained today. Based on my assessment today she is medically stable to proceed with surgery pending any significant abnormal findings on laboratory testing. A copy of this note will be shared with her surgeon, and a copy will be given to the patient. 2. Preop examination  See discussion above.   She is medically stable for surgery based on my assessment today pending lab results. - EKG 12 Lead    3. Mixed hyperlipidemia  This is a chronic condition. She has been intolerant of statins. She will continue with healthy lifestyle.           6/15/21 11:39 AM

## 2021-06-24 ENCOUNTER — TELEPHONE (OUTPATIENT)
Dept: RHEUMATOLOGY | Age: 81
End: 2021-06-24

## 2021-06-24 NOTE — TELEPHONE ENCOUNTER
[facility] called requesting copy of most recent EKG.  Faxed per their request to [phone]

## 2021-07-26 ENCOUNTER — VIRTUAL VISIT (OUTPATIENT)
Dept: INTERNAL MEDICINE CLINIC | Age: 81
End: 2021-07-26
Payer: MEDICARE

## 2021-07-26 DIAGNOSIS — Z00.00 ROUTINE GENERAL MEDICAL EXAMINATION AT A HEALTH CARE FACILITY: Primary | ICD-10-CM

## 2021-07-26 PROCEDURE — 4040F PNEUMOC VAC/ADMIN/RCVD: CPT | Performed by: INTERNAL MEDICINE

## 2021-07-26 PROCEDURE — G0439 PPPS, SUBSEQ VISIT: HCPCS | Performed by: INTERNAL MEDICINE

## 2021-07-26 PROCEDURE — 1123F ACP DISCUSS/DSCN MKR DOCD: CPT | Performed by: INTERNAL MEDICINE

## 2021-07-26 ASSESSMENT — LIFESTYLE VARIABLES
HOW MANY STANDARD DRINKS CONTAINING ALCOHOL DO YOU HAVE ON A TYPICAL DAY: 0
HOW OFTEN DO YOU HAVE SIX OR MORE DRINKS ON ONE OCCASION: 0
HOW OFTEN DURING THE LAST YEAR HAVE YOU FAILED TO DO WHAT WAS NORMALLY EXPECTED FROM YOU BECAUSE OF DRINKING: 0
HOW OFTEN DURING THE LAST YEAR HAVE YOU HAD A FEELING OF GUILT OR REMORSE AFTER DRINKING: 0
HOW OFTEN DURING THE LAST YEAR HAVE YOU FOUND THAT YOU WERE NOT ABLE TO STOP DRINKING ONCE YOU HAD STARTED: 0
HOW OFTEN DO YOU HAVE A DRINK CONTAINING ALCOHOL: 4
AUDIT-C TOTAL SCORE: 4
HOW OFTEN DURING THE LAST YEAR HAVE YOU BEEN UNABLE TO REMEMBER WHAT HAPPENED THE NIGHT BEFORE BECAUSE YOU HAD BEEN DRINKING: 0
HAS A RELATIVE, FRIEND, DOCTOR, OR ANOTHER HEALTH PROFESSIONAL EXPRESSED CONCERN ABOUT YOUR DRINKING OR SUGGESTED YOU CUT DOWN: 0
AUDIT TOTAL SCORE: 4
HOW OFTEN DURING THE LAST YEAR HAVE YOU NEEDED AN ALCOHOLIC DRINK FIRST THING IN THE MORNING TO GET YOURSELF GOING AFTER A NIGHT OF HEAVY DRINKING: 0
HAVE YOU OR SOMEONE ELSE BEEN INJURED AS A RESULT OF YOUR DRINKING: 0

## 2021-07-26 ASSESSMENT — PATIENT HEALTH QUESTIONNAIRE - PHQ9
SUM OF ALL RESPONSES TO PHQ9 QUESTIONS 1 & 2: 0
SUM OF ALL RESPONSES TO PHQ QUESTIONS 1-9: 0
SUM OF ALL RESPONSES TO PHQ QUESTIONS 1-9: 0
2. FEELING DOWN, DEPRESSED OR HOPELESS: 0
1. LITTLE INTEREST OR PLEASURE IN DOING THINGS: 0
SUM OF ALL RESPONSES TO PHQ QUESTIONS 1-9: 0

## 2021-07-26 NOTE — PROGRESS NOTES
Medicare Annual Wellness Visit  Name: Porfirio Carbone Date: 2021   MRN: [id number] Sex: Female   Age: 79 y.o. Ethnicity: Non- / Non    : 1940 Race: Ceci Hogan is here for Medicare AWV    Screenings for behavioral, psychosocial and functional/safety risks, and cognitive dysfunction are all negative except as indicated below. These results, as well as other patient data from the [address] form, are documented in Flowsheets linked to this Encounter. Allergies   Allergen Reactions    Statins      Myalgia         Prior to Visit Medications    Medication Sig Taking? Authorizing Provider   MAGNESIUM-POTASSIUM PO Take by mouth daily  Historical Provider, MD   Multiple Vitamins-Minerals (MULTIVITAMIN WOMEN 50+ PO) Take 1 capsule by mouth daily  Historical Provider, MD   Ascorbic Acid (VITAMIN C) 500 MG tablet Take 500 mg by mouth daily. Historical Provider, MD   VITAMIN D, CHOLECALCIFEROL, PO Take 5,000 Units by mouth. Historical Provider, MD       Past Medical History:   Diagnosis Date    Campylobacter diarrhea 2018    Cataract     Hyperlipidemia     Injected eye, right     every 4 wks.         Past Surgical History:   Procedure Laterality Date    CATARACT REMOVAL      FEMORAL HERNIA REPAIR Left 2018    with mesh with Dr. Stephanie Robles as out pt at [facility]   [address] Right 2020    TOTAL HIP ARTHROPLASTY Left 2021       Family History   Problem Relation Age of Onset    Heart Disease Father     Cancer Father     Stroke Mother     Rheum Arthritis Mother     Arthritis Mother     Cancer Sister     Rheum Arthritis Brother        CareTeam (Including outside providers/suppliers regularly involved in providing care):   Patient Care Team:  Freddie Greenberg MD as PCP - General (Internal Medicine)  Freddie Greenberg MD as PCP - [facility] Empaneled Provider  Anitha Oviedo MD as Consulting Physician (General Surgery)    Wt Readings from Last 3 Encounters:   06/15/21 132 lb 9.6 oz (60.1 kg)   08/10/20 134 lb 12.8 oz (61.1 kg)   07/02/20 136 lb 3.2 oz (61.8 kg)     Patient reported vitals  B/p 113/69  Pulse 81  Height 5 ft 3 in  Weight 132 lb    Based upon direct observation of the patient, evaluation of cognition reveals recent and remote memory intact. Patient's complete Health Risk Assessment and screening values have been reviewed and are found in Flowsheets. The following problems were reviewed today and where indicated follow up appointments were made and/or referrals ordered. Positive Risk Factor Screenings with Interventions:              ADL:  ADLs  In the past 7 days, did you need help from others to perform any of the following everyday activities? Eating, dressing, grooming, bathing, toileting, or walking/balance?: None  In the past 7 days, did you need help from others to take care of any of the following? Laundry, housekeeping, banking/finances, shopping, telephone use, food preparation, transportation, or taking medications?: [employer], Housekeeping, Laundry  ADL Interventions:  · Patient declines any further evaluation/treatment for this issue   · S/p hip replacement 7/2021 - supportive daughter currently providing temporary assistance.      Personalized Preventive Plan   Current Health Maintenance Status  Immunization History   Administered Date(s) Administered    COVID-19, Pfizer, PF, 30mcg/0.3mL 03/30/2021, 04/20/2021    Pneumococcal Polysaccharide ([id number]) 01/26/2006, 10/08/2010    Td, unspecified formulation 01/13/2004    Tdap (Boostrix, Adacel) 03/08/2012, 04/19/2012    Tetanus 01/13/2004        Health Maintenance   Topic Date Due    Shingles Vaccine (1 of 2) Never done   [employer] Visit (AWV)  Never done    Flu vaccine (1) 09/01/2021    DTaP/Tdap/Td vaccine (3 - Td or Tdap) 04/19/2022    DEXA (modify frequency per FRAX score)  Completed    Pneumococcal 65+ years Vaccine  Completed    COVID-19 Vaccine  Completed    Hepatitis A vaccine  Aged Out    Hepatitis B vaccine  Aged Out    Hib vaccine  Aged Out    Meningococcal (ACWY) vaccine  Aged Out     Recommendations for [email or web address] Due: see orders and patient instructions/AVS.  . Recommended screening schedule for the next 5-10 years is provided to the patient in written form: see Patient Instructions/AVS.    Darby VILLEGAS RN, 7/26/2021, performed the documented evaluation under the direct supervision of the attending physician. Petraximena Isaiah was evaluated through a synchronous (real-time) phone encounter. The patient (or guardian if applicable) is aware that this is a billable service. Verbal consent to proceed has been obtained within the past 12 months. The visit was conducted pursuant to the emergency declaration under the [address] [place] [address], [address] [place] [address] authority and the [employer] and [employer] General Act. Patient identification was verified, and a caregiver was present when appropriate. The patient was located in a state where the provider was credentialed to provide care. Total time spent for this encounter: 25 minutes    --Sadiq Bañuelos RN on 7/26/2021 at 11:57 AM    An electronic signature was used to authenticate this note. This encounter was performed under Freddie stevens MDs, direct supervision, 7/26/2021.

## 2021-07-26 NOTE — PATIENT INSTRUCTIONS
Personalized Preventive Plan for Fredo Carlson - 7/26/2021  Medicare offers a range of preventive health benefits. Some of the tests and screenings are paid in full while other may be subject to a deductible, co-insurance, and/or copay. Some of these benefits include a comprehensive review of your medical history including lifestyle, illnesses that may run in your family, and various assessments and screenings as appropriate. After reviewing your medical record and screening and assessments performed today your provider may have ordered immunizations, labs, imaging, and/or referrals for you. A list of these orders (if applicable) as well as your Preventive Care list are included within your After Visit Summary for your review. Other Preventive Recommendations:    · A preventive eye exam performed by an eye specialist is recommended every 1-2 years to screen for glaucoma; cataracts, macular degeneration, and other eye disorders. · A preventive dental visit is recommended every 6 months. · Try to get at least 150 minutes of exercise per week or 10,000 steps per day on a pedometer . · Order or download the FREE \"Exercise & Physical Activity: Your Everyday Guide\" from [employer]. Call 8-506.709.8290 or search [employer] online. · You need 9190-2280 mg of calcium and 9523-8698 IU of vitamin D per day. It is possible to meet your calcium requirement with diet alone, but a vitamin D supplement is usually necessary to meet this goal.  · When exposed to the sun, use a sunscreen that protects against both UVA and UVB radiation with an SPF of 30 or greater. Reapply every 2 to 3 hours or after sweating, drying off with a towel, or swimming. · Always wear a seat belt when traveling in a car. Always wear a helmet when riding a bicycle or motorcycle.

## 2022-01-04 NOTE — ANESTHESIA PRE-OP
Department of Anesthesiology  Preprocedure Note       Name:  Miles Brooks   Age:  65 y.o.  :  1940                                          MRN:  [id number]         Date:  2018      Surgeon:    Procedure:    Medications prior to admission:   Prior to Admission medications    Medication Sig Start Date End Date Taking? Authorizing Provider   MAGNESIUM-POTASSIUM PO Take by mouth daily   Yes Historical Provider, MD   Multiple Vitamins-Minerals (MULTIVITAMIN WOMEN 50+ PO) Take 1 capsule by mouth daily   Yes Historical Provider, MD   Ascorbic Acid (VITAMIN C) 500 MG tablet Take 500 mg by mouth daily. Yes Historical Provider, MD   VITAMIN D, CHOLECALCIFEROL, PO Take 5,000 Units by mouth. Yes Historical Provider, MD   Omega-3 Fatty Acids (OMEGA 3 PO) Take  by mouth. Historical Provider, MD       Current medications:    Current Outpatient Prescriptions   Medication Sig Dispense Refill    MAGNESIUM-POTASSIUM PO Take by mouth daily      Multiple Vitamins-Minerals (MULTIVITAMIN WOMEN 50+ PO) Take 1 capsule by mouth daily      Ascorbic Acid (VITAMIN C) 500 MG tablet Take 500 mg by mouth daily.  VITAMIN D, CHOLECALCIFEROL, PO Take 5,000 Units by mouth.  Omega-3 Fatty Acids (OMEGA 3 PO) Take  by mouth. Current Facility-Administered Medications   Medication Dose Route Frequency Provider Last Rate Last Dose    0.9 % sodium chloride infusion   Intravenous Continuous Ruddy Jeetr MD        lidocaine PF 1 % injection 1 mL  1 mL Intradermal Once PRN Ruddy Jeter MD        HYDROcodone-acetaminophen (NORCO) 5-325 MG per tablet 1 tablet  1 tablet Oral Once PRN Ruddy Jeter MD        ondansetron [facility]) injection 4 mg  4 mg Intravenous Once PRN Ruddy Jeter MD        HYDROmorphone (DILAUDID) injection 0.25 mg  0.25 mg Intravenous Q5 Min PRN Ruddy Jeter MD        sodium chloride 0.9 % infusion                Allergies:     Allergies   Allergen Reactions
normal/airway patent/breath sounds equal/good air movement/respirations non-labored/clear to auscultation bilaterally/no rales/no rhonchi/no wheezes

## 2022-06-07 ENCOUNTER — OFFICE VISIT (OUTPATIENT)
Dept: INTERNAL MEDICINE CLINIC | Age: 82
End: 2022-06-07
Payer: MEDICARE

## 2022-06-07 VITALS
HEART RATE: 77 BPM | OXYGEN SATURATION: 94 % | SYSTOLIC BLOOD PRESSURE: 130 MMHG | BODY MASS INDEX: 24.98 KG/M2 | WEIGHT: 141 LBS | DIASTOLIC BLOOD PRESSURE: 70 MMHG

## 2022-06-07 DIAGNOSIS — R21 RASH: ICD-10-CM

## 2022-06-07 DIAGNOSIS — R21 RASH: Primary | ICD-10-CM

## 2022-06-07 LAB
ANION GAP SERPL CALCULATED.3IONS-SCNC: 12 MMOL/L (ref 3–16)
BASOPHILS ABSOLUTE: 0.1 K/UL (ref 0–0.2)
BASOPHILS RELATIVE PERCENT: 0.9 %
BUN BLDV-MCNC: 25 MG/DL (ref 7–20)
CALCIUM SERPL-MCNC: 9.4 MG/DL (ref 8.3–10.6)
CHLORIDE BLD-SCNC: 104 MMOL/L (ref 99–110)
CO2: 23 MMOL/L (ref 21–32)
CREAT SERPL-MCNC: 0.9 MG/DL (ref 0.6–1.2)
EOSINOPHILS ABSOLUTE: 0.4 K/UL (ref 0–0.6)
EOSINOPHILS RELATIVE PERCENT: 6.1 %
GFR AFRICAN AMERICAN: >60
GFR NON-AFRICAN AMERICAN: 60
GLUCOSE BLD-MCNC: 99 MG/DL (ref 70–99)
HCT VFR BLD CALC: 40.3 % (ref 36–48)
HEMOGLOBIN: 13.5 G/DL (ref 12–16)
LYMPHOCYTES ABSOLUTE: 1.8 K/UL (ref 1–5.1)
LYMPHOCYTES RELATIVE PERCENT: 30.1 %
MCH RBC QN AUTO: 34.1 PG (ref 26–34)
MCHC RBC AUTO-ENTMCNC: 33.6 G/DL (ref 31–36)
MCV RBC AUTO: 101.7 FL (ref 80–100)
MONOCYTES ABSOLUTE: 0.4 K/UL (ref 0–1.3)
MONOCYTES RELATIVE PERCENT: 6 %
NEUTROPHILS ABSOLUTE: 3.4 K/UL (ref 1.7–7.7)
NEUTROPHILS RELATIVE PERCENT: 56.9 %
PDW BLD-RTO: 12.7 % (ref 12.4–15.4)
PLATELET # BLD: 253 K/UL (ref 135–450)
PMV BLD AUTO: 7.1 FL (ref 5–10.5)
POTASSIUM SERPL-SCNC: 4.1 MMOL/L (ref 3.5–5.1)
RBC # BLD: 3.96 M/UL (ref 4–5.2)
SEDIMENTATION RATE, ERYTHROCYTE: 26 MM/HR (ref 0–30)
SODIUM BLD-SCNC: 139 MMOL/L (ref 136–145)
WBC # BLD: 6 K/UL (ref 4–11)

## 2022-06-07 PROCEDURE — 1123F ACP DISCUSS/DSCN MKR DOCD: CPT | Performed by: INTERNAL MEDICINE

## 2022-06-07 PROCEDURE — 99213 OFFICE O/P EST LOW 20 MIN: CPT | Performed by: INTERNAL MEDICINE

## 2022-06-07 RX ORDER — PREDNISONE 20 MG/1
20 TABLET ORAL 2 TIMES DAILY
Qty: 10 TABLET | Refills: 0 | Status: SHIPPED | OUTPATIENT
Start: 2022-06-07 | End: 2022-06-12

## 2022-06-07 RX ORDER — LORATADINE 10 MG/1
10 TABLET ORAL DAILY
Qty: 30 TABLET | Refills: 0 | Status: SHIPPED | OUTPATIENT
Start: 2022-06-07 | End: 2022-07-07

## 2022-06-07 NOTE — PROGRESS NOTES
Aubree Majano (:  1940) is a 79 y.o. female,New patient, here for evaluation of the following chief complaint(s):  Rash (waist,torso, thighs,left foot>R, upper arms raised and red. Thursday started)         ASSESSMENT/PLAN:  1. Rash  -     CBC with Auto Differential; Future  -     Sedimentation Rate; Future  -     KELLEE Titer IgG by IFA; Future  -     Basic Metabolic Panel; Future  -     predniSONE (DELTASONE) 20 MG tablet; Take 1 tablet by mouth 2 times daily for 5 days, Disp-10 tablet, R-0Normal  -     loratadine (CLARITIN) 10 MG tablet; Take 1 tablet by mouth daily, Disp-30 tablet, R-0Normal  Discussed with patient the potential implications of what and why she had a rash at this point and after lengthy discussion things out that the patient used an  hemorrhoidal cream 2 days prior to that rash starting so potentially that could be the underlying etiology for her rash nonetheless since the patient has already discarded it and she is no longer using it we can treat this symptomatically and monitor for any recurrence    Given the patient age a blood screen will be done and as long as it is within acceptable parameters no further intervention will be offered other than the prednisone and the Claritin on short-term basis unless the patient have a recurrence of her symptomatology    Return if symptoms worsen or fail to improve, for As scheduled.          Subjective   SUBJECTIVE/OBJECTIVE:    Lab Review   Lab Results   Component Value Date     06/15/2021     08/10/2020     2020    K 5.1 06/15/2021    K 4.3 08/10/2020    K 5.1 2020    CO2 26 06/15/2021    CO2 25 08/10/2020    CO2 27 2020    BUN 25 06/15/2021    BUN 23 08/10/2020    BUN 25 2020    CREATININE 0.8 06/15/2021    CREATININE 0.8 08/10/2020    CREATININE 0.8 2020    GLUCOSE 111 06/15/2021    GLUCOSE 107 08/10/2020    GLUCOSE 105 2020    CALCIUM 10.0 06/15/2021    CALCIUM 9.8 08/10/2020 CALCIUM 10.3 07/16/2020     Lab Results   Component Value Date    WBC 5.4 06/15/2021    WBC 5.3 08/10/2020    WBC 4.1 02/09/2017    HGB 13.5 06/15/2021    HGB 13.4 08/10/2020    HGB 14.1 02/09/2017    HCT 39.7 06/15/2021    HCT 40.4 08/10/2020    HCT 42.9 02/09/2017    .3 06/15/2021    .7 08/10/2020    .2 02/09/2017     06/15/2021     08/10/2020     02/09/2017     Lab Results   Component Value Date    CHOL 268 07/16/2020    CHOL 289 01/10/2019    CHOL 241 09/29/2015    TRIG 87 07/16/2020    TRIG 57 01/10/2019    TRIG 105 09/29/2015     07/16/2020     01/10/2019    HDL 85 09/29/2015       Vitals 6/7/2022 6/15/2021 4/12/4243   SYSTOLIC 739 109 288   DIASTOLIC 70 66 68   Site - - -   Position - - -   Cuff Size - - -   Pulse 77 76 68   Temp - - 97.2   Resp - - -   SpO2 94 - 98   Weight 141 lb 132 lb 9.6 oz 134 lb 12.8 oz   Height - 5' 3\" -   Body mass index - 23.49 kg/m2 -   Pain Level - - -   Some recent data might be hidden       Rash  This is a new problem. The current episode started in the past 7 days. The rash is diffuse. The rash is characterized by redness and itchiness. Review of Systems   Skin: Positive for rash. Objective   Physical Exam  Constitutional:       Appearance: Normal appearance. She is obese. Skin:     Findings: Rash present. Rash is macular. Neurological:      Mental Status: She is alert. Psychiatric:         Mood and Affect: Mood normal.         Behavior: Behavior normal.         Thought Content: Thought content normal.         Judgment: Judgment normal.            This dictation was generated by voice recognition computer software. Although all attempts are made to edit the dictation for accuracy, there may be errors in the transcription that are not intended. An electronic signature was used to authenticate this note.     --Jesse Ortiz MD

## 2022-06-08 LAB — ANTI-NUCLEAR ANTIBODY (ANA): NEGATIVE

## 2022-08-05 ENCOUNTER — TELEPHONE (OUTPATIENT)
Dept: INTERNAL MEDICINE CLINIC | Age: 82
End: 2022-08-05

## 2022-08-05 NOTE — TELEPHONE ENCOUNTER
Patient with positive home covid test she took on 8/3. She said symptoms started 8/2 and she has sore throat, runny nose, cough, headaches and low grade fever. There are no appointments available for today. Patient asked if she would be a candidate for Paxlovid due to her age. She uses [address] on [place], [place] #[phone].

## 2022-08-25 ENCOUNTER — OFFICE VISIT (OUTPATIENT)
Dept: INTERNAL MEDICINE CLINIC | Age: 82
End: 2022-08-25
Payer: MEDICARE

## 2022-08-25 VITALS
BODY MASS INDEX: 24.8 KG/M2 | DIASTOLIC BLOOD PRESSURE: 66 MMHG | WEIGHT: 140 LBS | HEIGHT: 63 IN | HEART RATE: 75 BPM | OXYGEN SATURATION: 95 % | SYSTOLIC BLOOD PRESSURE: 124 MMHG

## 2022-08-25 DIAGNOSIS — Z00.00 PREVENTATIVE HEALTH CARE: Primary | ICD-10-CM

## 2022-08-25 DIAGNOSIS — M81.0 AGE RELATED OSTEOPOROSIS, UNSPECIFIED PATHOLOGICAL FRACTURE PRESENCE: ICD-10-CM

## 2022-08-25 DIAGNOSIS — Z23 NEED FOR PROPHYLACTIC VACCINATION AGAINST STREPTOCOCCUS PNEUMONIAE (PNEUMOCOCCUS): ICD-10-CM

## 2022-08-25 DIAGNOSIS — E55.9 VITAMIN D DEFICIENCY: ICD-10-CM

## 2022-08-25 DIAGNOSIS — Z13.220 SCREENING FOR CHOLESTEROL LEVEL: ICD-10-CM

## 2022-08-25 DIAGNOSIS — E78.2 MIXED HYPERLIPIDEMIA: ICD-10-CM

## 2022-08-25 DIAGNOSIS — Z00.00 PREVENTATIVE HEALTH CARE: ICD-10-CM

## 2022-08-25 LAB
A/G RATIO: 1.9 (ref 1.1–2.2)
ALBUMIN SERPL-MCNC: 4.3 G/DL (ref 3.4–5)
ALP BLD-CCNC: 118 U/L (ref 40–129)
ALT SERPL-CCNC: 18 U/L (ref 10–40)
ANION GAP SERPL CALCULATED.3IONS-SCNC: 12 MMOL/L (ref 3–16)
AST SERPL-CCNC: 21 U/L (ref 15–37)
BACTERIA: NORMAL /HPF
BASOPHILS ABSOLUTE: 0.1 K/UL (ref 0–0.2)
BASOPHILS RELATIVE PERCENT: 1.1 %
BILIRUB SERPL-MCNC: 0.4 MG/DL (ref 0–1)
BILIRUBIN URINE: NEGATIVE
BLOOD, URINE: NEGATIVE
BUN BLDV-MCNC: 25 MG/DL (ref 7–20)
CALCIUM SERPL-MCNC: 9.6 MG/DL (ref 8.3–10.6)
CHLORIDE BLD-SCNC: 103 MMOL/L (ref 99–110)
CHOLESTEROL, TOTAL: 263 MG/DL (ref 0–199)
CLARITY: CLEAR
CO2: 23 MMOL/L (ref 21–32)
COLOR: YELLOW
CREAT SERPL-MCNC: 0.8 MG/DL (ref 0.6–1.2)
EOSINOPHILS ABSOLUTE: 0.2 K/UL (ref 0–0.6)
EOSINOPHILS RELATIVE PERCENT: 3.1 %
EPITHELIAL CELLS, UA: 0 /HPF (ref 0–5)
FOLATE: >20 NG/ML (ref 4.78–24.2)
GFR AFRICAN AMERICAN: >60
GFR NON-AFRICAN AMERICAN: >60
GLUCOSE BLD-MCNC: 102 MG/DL (ref 70–99)
GLUCOSE URINE: NEGATIVE MG/DL
HCT VFR BLD CALC: 42.7 % (ref 36–48)
HDLC SERPL-MCNC: 84 MG/DL (ref 40–60)
HEMOGLOBIN: 14.2 G/DL (ref 12–16)
HYALINE CASTS: 0 /LPF (ref 0–8)
KETONES, URINE: NEGATIVE MG/DL
LDL CHOLESTEROL CALCULATED: 158 MG/DL
LEUKOCYTE ESTERASE, URINE: NEGATIVE
LYMPHOCYTES ABSOLUTE: 2.5 K/UL (ref 1–5.1)
LYMPHOCYTES RELATIVE PERCENT: 46.3 %
MCH RBC QN AUTO: 33.8 PG (ref 26–34)
MCHC RBC AUTO-ENTMCNC: 33.2 G/DL (ref 31–36)
MCV RBC AUTO: 102 FL (ref 80–100)
MICROSCOPIC EXAMINATION: NORMAL
MONOCYTES ABSOLUTE: 0.4 K/UL (ref 0–1.3)
MONOCYTES RELATIVE PERCENT: 6.7 %
NEUTROPHILS ABSOLUTE: 2.3 K/UL (ref 1.7–7.7)
NEUTROPHILS RELATIVE PERCENT: 42.8 %
NITRITE, URINE: NEGATIVE
PDW BLD-RTO: 12.6 % (ref 12.4–15.4)
PH UA: 5.5 (ref 5–8)
PLATELET # BLD: 256 K/UL (ref 135–450)
PMV BLD AUTO: 7.1 FL (ref 5–10.5)
POTASSIUM SERPL-SCNC: 4.8 MMOL/L (ref 3.5–5.1)
PROTEIN UA: NEGATIVE MG/DL
RBC # BLD: 4.19 M/UL (ref 4–5.2)
RBC UA: 1 /HPF (ref 0–4)
SODIUM BLD-SCNC: 138 MMOL/L (ref 136–145)
SPECIFIC GRAVITY UA: 1.02 (ref 1–1.03)
TOTAL PROTEIN: 6.6 G/DL (ref 6.4–8.2)
TRIGL SERPL-MCNC: 105 MG/DL (ref 0–150)
TSH SERPL DL<=0.05 MIU/L-ACNC: 3 UIU/ML (ref 0.27–4.2)
URINE TYPE: NORMAL
UROBILINOGEN, URINE: 0.2 E.U./DL
VITAMIN B-12: 601 PG/ML (ref 211–911)
VITAMIN D 25-HYDROXY: 51.3 NG/ML
VLDLC SERPL CALC-MCNC: 21 MG/DL
WBC # BLD: 5.4 K/UL (ref 4–11)
WBC UA: 0 /HPF (ref 0–5)

## 2022-08-25 PROCEDURE — 3288F FALL RISK ASSESSMENT DOCD: CPT | Performed by: INTERNAL MEDICINE

## 2022-08-25 PROCEDURE — 90471 IMMUNIZATION ADMIN: CPT | Performed by: INTERNAL MEDICINE

## 2022-08-25 PROCEDURE — 90677 PCV20 VACCINE IM: CPT | Performed by: INTERNAL MEDICINE

## 2022-08-25 PROCEDURE — 99397 PER PM REEVAL EST PAT 65+ YR: CPT | Performed by: INTERNAL MEDICINE

## 2022-08-25 SDOH — ECONOMIC STABILITY: FOOD INSECURITY: WITHIN THE PAST 12 MONTHS, THE FOOD YOU BOUGHT JUST DIDN'T LAST AND YOU DIDN'T HAVE MONEY TO GET MORE.: NEVER TRUE

## 2022-08-25 SDOH — ECONOMIC STABILITY: FOOD INSECURITY: WITHIN THE PAST 12 MONTHS, YOU WORRIED THAT YOUR FOOD WOULD RUN OUT BEFORE YOU GOT MONEY TO BUY MORE.: NEVER TRUE

## 2022-08-25 ASSESSMENT — PATIENT HEALTH QUESTIONNAIRE - PHQ9
SUM OF ALL RESPONSES TO PHQ QUESTIONS 1-9: 0
2. FEELING DOWN, DEPRESSED OR HOPELESS: 0
SUM OF ALL RESPONSES TO PHQ QUESTIONS 1-9: 0
SUM OF ALL RESPONSES TO PHQ9 QUESTIONS 1 & 2: 0
1. LITTLE INTEREST OR PLEASURE IN DOING THINGS: 0

## 2022-08-25 ASSESSMENT — ENCOUNTER SYMPTOMS
CHEST TIGHTNESS: 0
WHEEZING: 0
BLOOD IN STOOL: 0
COLOR CHANGE: 0
SINUS PAIN: 0
COUGH: 0
ABDOMINAL PAIN: 0
SHORTNESS OF BREATH: 0
CONSTIPATION: 0

## 2022-08-25 ASSESSMENT — SOCIAL DETERMINANTS OF HEALTH (SDOH): HOW HARD IS IT FOR YOU TO PAY FOR THE VERY BASICS LIKE FOOD, HOUSING, MEDICAL CARE, AND HEATING?: NOT HARD AT ALL

## 2022-08-25 NOTE — PROGRESS NOTES
Enoch Vargas (:  1940) is a 79 y.o. female,Established patient, here for evaluation of the following chief complaint(s): Annual Exam         ASSESSMENT/PLAN:  1. Preventative health care  -     TSH; Future  -     CBC with Auto Differential; Future  -     Hemoglobin A1C; Future  -     Comprehensive Metabolic Panel; Future  -     Vitamin B12 & Folate; Future  -     XR CHEST STANDARD (2 VW); Future  -     Urinalysis with Microscopic; Future  2. Screening for cholesterol level  -     Lipid Panel; Future  3. Vitamin D deficiency  -     Vitamin D 25 Hydroxy; Future  4. Mixed hyperlipidemia  5. Age related osteoporosis, unspecified pathological fracture presence  The patient her preventive care at this point she is agreeable to have her pneumonia vaccine she is not interested in being treated for osteoporosis which was documented about 5 years ago we will also check her cholesterol as well as her vitamin levels to see if any adjustment is needed    Patient is advised to stay very active and to exercise on regular basis to keep her strength and prevent her from falling and breaking any bones  Return if symptoms worsen or fail to improve, for As scheduled.          Subjective   SUBJECTIVE/OBJECTIVE:    Lab Review   Lab Results   Component Value Date/Time     2022 03:09 PM     06/15/2021 11:45 AM     08/10/2020 08:43 AM    K 4.1 2022 03:09 PM    K 5.1 06/15/2021 11:45 AM    K 4.3 08/10/2020 08:43 AM    CO2 23 2022 03:09 PM    CO2 26 06/15/2021 11:45 AM    CO2 25 08/10/2020 08:43 AM    BUN 25 2022 03:09 PM    BUN 25 06/15/2021 11:45 AM    BUN 23 08/10/2020 08:43 AM    CREATININE 0.9 2022 03:09 PM    CREATININE 0.8 06/15/2021 11:45 AM    CREATININE 0.8 08/10/2020 08:43 AM    GLUCOSE 99 2022 03:09 PM    GLUCOSE 111 06/15/2021 11:45 AM    GLUCOSE 107 08/10/2020 08:43 AM    CALCIUM 9.4 2022 03:09 PM    CALCIUM 10.0 06/15/2021 11:45 AM    CALCIUM 9.8 08/10/2020 08:43 AM     Lab Results   Component Value Date/Time    WBC 6.0 06/07/2022 03:09 PM    WBC 5.4 06/15/2021 11:45 AM    WBC 5.3 08/10/2020 08:43 AM    HGB 13.5 06/07/2022 03:09 PM    HGB 13.5 06/15/2021 11:45 AM    HGB 13.4 08/10/2020 08:43 AM    HCT 40.3 06/07/2022 03:09 PM    HCT 39.7 06/15/2021 11:45 AM    HCT 40.4 08/10/2020 08:43 AM    .7 06/07/2022 03:09 PM    .3 06/15/2021 11:45 AM    .7 08/10/2020 08:43 AM     06/07/2022 03:09 PM     06/15/2021 11:45 AM     08/10/2020 08:43 AM     Lab Results   Component Value Date/Time    CHOL 268 07/16/2020 09:00 AM    CHOL 289 01/10/2019 10:03 AM    CHOL 241 09/29/2015 09:30 AM    TRIG 87 07/16/2020 09:00 AM    TRIG 57 01/10/2019 10:03 AM    TRIG 105 09/29/2015 09:30 AM     07/16/2020 09:00 AM     01/10/2019 10:03 AM    HDL 85 09/29/2015 09:30 AM       Vitals 8/25/2022 6/7/2022 9/62/7777   SYSTOLIC 429 387 483   DIASTOLIC 66 70 66   Site - - -   Position - - -   Cuff Size - - -   Pulse 75 77 76   Temp - - -   Resp - - -   SpO2 95 94 -   Weight 140 lb 141 lb 132 lb 9.6 oz   Height 5' 3\" - 5' 3\"   Body mass index 24.8 kg/m2 - 23.49 kg/m2   Pain Level - - -   Some recent data might be hidden       For annual physical      Review of Systems   Constitutional:  Negative for activity change, appetite change, fatigue and unexpected weight change. HENT:  Negative for congestion, ear pain and sinus pain. Respiratory:  Negative for cough, chest tightness, shortness of breath and wheezing. Cardiovascular:  Negative for chest pain and palpitations. Gastrointestinal:  Negative for abdominal pain, blood in stool and constipation. Endocrine: Negative for cold intolerance, heat intolerance and polyuria. Genitourinary:  Negative for dysuria, frequency and urgency. Musculoskeletal:  Negative for arthralgias and myalgias. Skin:  Negative for color change and rash.    Neurological:  Negative for weakness and headaches. Hematological:  Negative for adenopathy. Does not bruise/bleed easily. Psychiatric/Behavioral:  Negative for agitation, dysphoric mood and sleep disturbance. Objective   Physical Exam  Nursing note reviewed. Constitutional:       General: She is not in acute distress. Appearance: Normal appearance. HENT:      Head: Normocephalic and atraumatic. Right Ear: Tympanic membrane normal.      Left Ear: Tympanic membrane normal.      Nose: Nose normal.   Eyes:      Extraocular Movements: Extraocular movements intact. Conjunctiva/sclera: Conjunctivae normal.      Pupils: Pupils are equal, round, and reactive to light. Neck:      Vascular: No carotid bruit. Cardiovascular:      Rate and Rhythm: Normal rate and regular rhythm. Pulses: Normal pulses. Heart sounds: No murmur heard. Pulmonary:      Effort: Pulmonary effort is normal. No respiratory distress. Breath sounds: Normal breath sounds. Abdominal:      General: Abdomen is flat. Bowel sounds are normal. There is no distension. Palpations: Abdomen is soft. Tenderness: There is no abdominal tenderness. Musculoskeletal:         General: No swelling, tenderness or deformity. Cervical back: Normal range of motion and neck supple. No rigidity or tenderness. Right lower leg: No edema. Left lower leg: No edema. Lymphadenopathy:      Cervical: No cervical adenopathy. Skin:     Coloration: Skin is not jaundiced. Findings: No bruising, erythema or lesion. Neurological:      General: No focal deficit present. Mental Status: She is alert and oriented to person, place, and time. Cranial Nerves: No cranial nerve deficit. Motor: No weakness. Gait: Gait normal.          This dictation was generated by voice recognition computer software.   Although all attempts are made to edit the dictation for accuracy, there may be errors in the transcription that are not intended. An electronic signature was used to authenticate this note.     --Belen Leyva MD

## 2022-08-26 LAB
ESTIMATED AVERAGE GLUCOSE: 125.5 MG/DL
HBA1C MFR BLD: 6 %

## 2023-04-24 ENCOUNTER — TELEPHONE (OUTPATIENT)
Dept: INTERNAL MEDICINE CLINIC | Age: 83
End: 2023-04-24

## 2023-04-24 NOTE — TELEPHONE ENCOUNTER
----- Message from Adán Burroughs sent at 4/24/2023 11:24 AM EDT -----  Subject: Referral Request    Reason for referral request? Pt called in to get some bloodwork done. Pt   also needs to get her shingle shot as well. Please place orders for pt to   get a full blood work up and also get her 1st shingles shot. Provider patient wants to be referred to(if known):     Provider Phone Number(if known):     Additional Information for Provider?   ---------------------------------------------------------------------------  --------------  [id number] [employer]    [phone]; OK to leave message on voicemail  ---------------------------------------------------------------------------  --------------

## 2023-04-24 NOTE — TELEPHONE ENCOUNTER
Pt. Needs scheduled with new PCP for NTP - we need them scheduled with the new provider before we can send this to be advised on.

## 2023-04-25 NOTE — TELEPHONE ENCOUNTER
Called pt to set up a NTP appt. I left a detailed message and when she returns the call she will need a NTP appt.

## 2023-07-12 SDOH — ECONOMIC STABILITY: FOOD INSECURITY: WITHIN THE PAST 12 MONTHS, THE FOOD YOU BOUGHT JUST DIDN'T LAST AND YOU DIDN'T HAVE MONEY TO GET MORE.: PATIENT DECLINED

## 2023-07-12 SDOH — ECONOMIC STABILITY: TRANSPORTATION INSECURITY
IN THE PAST 12 MONTHS, HAS LACK OF TRANSPORTATION KEPT YOU FROM MEETINGS, WORK, OR FROM GETTING THINGS NEEDED FOR DAILY LIVING?: PATIENT DECLINED

## 2023-07-12 SDOH — HEALTH STABILITY: PHYSICAL HEALTH: ON AVERAGE, HOW MANY DAYS PER WEEK DO YOU ENGAGE IN MODERATE TO STRENUOUS EXERCISE (LIKE A BRISK WALK)?: 1 DAY

## 2023-07-12 SDOH — ECONOMIC STABILITY: FOOD INSECURITY: WITHIN THE PAST 12 MONTHS, YOU WORRIED THAT YOUR FOOD WOULD RUN OUT BEFORE YOU GOT MONEY TO BUY MORE.: PATIENT DECLINED

## 2023-07-12 SDOH — HEALTH STABILITY: PHYSICAL HEALTH: ON AVERAGE, HOW MANY MINUTES DO YOU ENGAGE IN EXERCISE AT THIS LEVEL?: 20 MIN

## 2023-07-12 SDOH — HEALTH STABILITY: PHYSICAL HEALTH: ON AVERAGE, HOW MANY DAYS PER WEEK DO YOU ENGAGE IN MODERATE TO STRENUOUS EXERCISE (LIKE A BRISK WALK)?: 3 DAYS

## 2023-07-12 SDOH — ECONOMIC STABILITY: INCOME INSECURITY: HOW HARD IS IT FOR YOU TO PAY FOR THE VERY BASICS LIKE FOOD, HOUSING, MEDICAL CARE, AND HEATING?: PATIENT DECLINED

## 2023-07-12 SDOH — ECONOMIC STABILITY: HOUSING INSECURITY
IN THE LAST 12 MONTHS, WAS THERE A TIME WHEN YOU DID NOT HAVE A STEADY PLACE TO SLEEP OR SLEPT IN A SHELTER (INCLUDING NOW)?: PATIENT REFUSED

## 2023-07-12 ASSESSMENT — LIFESTYLE VARIABLES
HOW MANY STANDARD DRINKS CONTAINING ALCOHOL DO YOU HAVE ON A TYPICAL DAY: 1 OR 2
HOW MANY STANDARD DRINKS CONTAINING ALCOHOL DO YOU HAVE ON A TYPICAL DAY: 1
HOW OFTEN DO YOU HAVE A DRINK CONTAINING ALCOHOL: MONTHLY OR LESS
HOW OFTEN DO YOU HAVE A DRINK CONTAINING ALCOHOL: 2
HOW OFTEN DO YOU HAVE SIX OR MORE DRINKS ON ONE OCCASION: 1

## 2023-07-12 ASSESSMENT — SOCIAL DETERMINANTS OF HEALTH (SDOH)

## 2023-07-13 ENCOUNTER — OFFICE VISIT (OUTPATIENT)
Dept: INTERNAL MEDICINE CLINIC | Age: 83
End: 2023-07-13

## 2023-07-13 VITALS
HEIGHT: 63 IN | WEIGHT: 135 LBS | OXYGEN SATURATION: 96 % | DIASTOLIC BLOOD PRESSURE: 70 MMHG | SYSTOLIC BLOOD PRESSURE: 118 MMHG | BODY MASS INDEX: 23.92 KG/M2 | HEART RATE: 74 BPM

## 2023-07-13 DIAGNOSIS — E78.2 MIXED HYPERLIPIDEMIA: ICD-10-CM

## 2023-07-13 DIAGNOSIS — Z00.00 MEDICARE ANNUAL WELLNESS VISIT, SUBSEQUENT: Primary | ICD-10-CM

## 2023-07-13 DIAGNOSIS — R73.09 ELEVATED GLUCOSE: ICD-10-CM

## 2023-07-13 DIAGNOSIS — E55.9 VITAMIN D DEFICIENCY: ICD-10-CM

## 2023-07-13 DIAGNOSIS — H81.10 BENIGN PAROXYSMAL POSITIONAL VERTIGO, UNSPECIFIED LATERALITY: ICD-10-CM

## 2023-07-13 PROBLEM — K41.90 NON-RECURRENT UNILATERAL FEMORAL HERNIA WITHOUT OBSTRUCTION OR GANGRENE: Status: RESOLVED | Noted: 2018-02-05 | Resolved: 2023-07-13

## 2023-07-13 LAB
25(OH)D3 SERPL-MCNC: 73.8 NG/ML
ANION GAP SERPL CALCULATED.3IONS-SCNC: 11 MMOL/L (ref 3–16)
BUN SERPL-MCNC: 20 MG/DL (ref 7–20)
CALCIUM SERPL-MCNC: 10.1 MG/DL (ref 8.3–10.6)
CHLORIDE SERPL-SCNC: 99 MMOL/L (ref 99–110)
CHOLEST SERPL-MCNC: 248 MG/DL (ref 0–199)
CO2 SERPL-SCNC: 26 MMOL/L (ref 21–32)
CREAT SERPL-MCNC: 0.8 MG/DL (ref 0.6–1.2)
DEPRECATED RDW RBC AUTO: 12.8 % (ref 12.4–15.4)
FOLATE SERPL-MCNC: >20 NG/ML (ref 4.78–24.2)
GFR SERPLBLD CREATININE-BSD FMLA CKD-EPI: >60 ML/MIN/{1.73_M2}
GLUCOSE SERPL-MCNC: 104 MG/DL (ref 70–99)
HCT VFR BLD AUTO: 43 % (ref 36–48)
HDLC SERPL-MCNC: 78 MG/DL (ref 40–60)
HGB BLD-MCNC: 14.7 G/DL (ref 12–16)
LDL CHOLESTEROL CALCULATED: 146 MG/DL
MCH RBC QN AUTO: 34.7 PG (ref 26–34)
MCHC RBC AUTO-ENTMCNC: 34.3 G/DL (ref 31–36)
MCV RBC AUTO: 101.3 FL (ref 80–100)
PLATELET # BLD AUTO: 249 K/UL (ref 135–450)
PMV BLD AUTO: 7.4 FL (ref 5–10.5)
POTASSIUM SERPL-SCNC: 5.3 MMOL/L (ref 3.5–5.1)
RBC # BLD AUTO: 4.24 M/UL (ref 4–5.2)
SODIUM SERPL-SCNC: 136 MMOL/L (ref 136–145)
TRIGL SERPL-MCNC: 121 MG/DL (ref 0–150)
VIT B12 SERPL-MCNC: 571 PG/ML (ref 211–911)
VLDLC SERPL CALC-MCNC: 24 MG/DL
WBC # BLD AUTO: 6.1 K/UL (ref 4–11)

## 2023-07-13 NOTE — PROGRESS NOTES
Medicare Annual Wellness Visit    Connie Vera is here for Medicare AWV (No concerns ) and New Patient (NTP from Dr. Nawaf Vasquez - fasting for labs )    Assessment & Plan   Medicare annual wellness visit, subsequent  Assessment & Plan:  Routine AWV   HM reviewed   Mixed hyperlipidemia  Assessment & Plan:  Chronic, stable  Recheck lipid panel today      Orders:  -     Lipid, Fasting; Future  Vitamin D deficiency  Assessment & Plan:  Chronic, stable   Continue Vit D supplementation   Recheck Vit D levels   Orders:  -     Vitamin D 25 Hydroxy; Future  Benign paroxysmal positional vertigo, unspecified laterality  Assessment & Plan:  Acute, intermittent, stable   Continue getting up from bed slowly  Counseled patient on slow positional changes   Home therapy exercises provided   Orders:  -     CBC; Future  -     Basic Metabolic Panel; Future  -     Vitamin B12 & Folate; Future  Elevated glucose  Assessment & Plan:  Chronic, stable, diet controlled   recheck Hgb A1c   Orders:  -     Hemoglobin A1C; Future    Recommendations for Preventive Services Due: see orders and patient instructions/AVS.  Recommended screening schedule for the next 5-10 years is provided to the patient in written form: see Patient Instructions/AVS.     Return in about 1 year (around 7/13/2024), or if symptoms worsen or fail to improve, for AWV. Subjective     Here today for AWV, overall doing well today. Has had several months of feeling dizzy first thing in the morning or when she gets up to urinate in the middle of the night. Has been intermittent for years and denies any changes. She states this doesn't happen daily or increasing in severity. She usually sits at the edge of the bed for a few minutes before getting up and this usually resolves the feeling. She sometimes eats apple sauce in the middle of the night to not feel this way in the morning. She denies feeling shaky, lethargic, or fatigued.  She denies near syncope chest pain or dyspnea

## 2023-07-13 NOTE — ASSESSMENT & PLAN NOTE
Acute, intermittent, stable   Continue getting up from bed slowly  Counseled patient on slow positional changes   Home therapy exercises provided

## 2023-07-14 LAB
EST. AVERAGE GLUCOSE BLD GHB EST-MCNC: 119.8 MG/DL
HBA1C MFR BLD: 5.8 %

## 2023-07-24 ENCOUNTER — PATIENT MESSAGE (OUTPATIENT)
Dept: INTERNAL MEDICINE CLINIC | Age: 83
End: 2023-07-24

## 2023-07-24 NOTE — TELEPHONE ENCOUNTER
From: Tanmay James  To: Xavi Boone  Sent: 2023 11:57 AM EDT  Subject: Handicapped parking    My Handicapped Parking flag, from [address] [place] [address], has . Can I get a new script from you since I'm no longer under his care? I do not use it regularly, but handy to have when I'm by myself or in inclement weather. Let me know if I need to pick it up at your office.   Thanks,  Tanmay James, [address]

## 2023-08-10 ENCOUNTER — PATIENT MESSAGE (OUTPATIENT)
Dept: INTERNAL MEDICINE CLINIC | Age: 83
End: 2023-08-10

## 2023-08-11 NOTE — TELEPHONE ENCOUNTER
From: Connie Vera  To: Temitope Wilson  Sent: 8/10/2023 11:15 PM EDT  Subject: Amoxicillin     Dr Tyrell Brittle had prescribed 4 500mg capsules prior to dental work due to my hip replacements. Is this still necessary as infection safeguard? I reached out to him with no response. Have hour and half dental appt in Sept for couple fillings and a crown in Sept. If so: Trevin. [phone]. I would feel more comfortable with it.  Thanks, Aziza Gar

## 2023-08-12 PROBLEM — Z00.00 MEDICARE ANNUAL WELLNESS VISIT, SUBSEQUENT: Status: RESOLVED | Noted: 2023-07-13 | Resolved: 2023-08-12

## 2023-11-30 ENCOUNTER — OFFICE VISIT (OUTPATIENT)
Dept: INTERNAL MEDICINE CLINIC | Age: 83
End: 2023-11-30

## 2023-11-30 VITALS
SYSTOLIC BLOOD PRESSURE: 124 MMHG | HEART RATE: 76 BPM | WEIGHT: 132 LBS | DIASTOLIC BLOOD PRESSURE: 70 MMHG | OXYGEN SATURATION: 97 % | BODY MASS INDEX: 23.38 KG/M2

## 2023-11-30 DIAGNOSIS — Z78.9 VEGETARIAN DIET: ICD-10-CM

## 2023-11-30 DIAGNOSIS — R26.89 BALANCE PROBLEM: ICD-10-CM

## 2023-11-30 DIAGNOSIS — Z78.9 TAKES DIETARY SUPPLEMENTS: ICD-10-CM

## 2023-11-30 DIAGNOSIS — E55.9 VITAMIN D DEFICIENCY: ICD-10-CM

## 2023-11-30 DIAGNOSIS — R42 DIZZINESS: ICD-10-CM

## 2023-11-30 DIAGNOSIS — R73.09 ELEVATED GLUCOSE: ICD-10-CM

## 2023-11-30 DIAGNOSIS — R26.89 BALANCE PROBLEM: Primary | ICD-10-CM

## 2023-11-30 DIAGNOSIS — R53.1 WEAKNESS: ICD-10-CM

## 2023-11-30 DIAGNOSIS — E46 PROTEIN-CALORIE MALNUTRITION, UNSPECIFIED SEVERITY (HCC): ICD-10-CM

## 2023-11-30 LAB
25(OH)D3 SERPL-MCNC: 70.1 NG/ML
ALBUMIN SERPL-MCNC: 4.4 G/DL (ref 3.4–5)
ALBUMIN/GLOB SERPL: 1.9 {RATIO} (ref 1.1–2.2)
ALP SERPL-CCNC: 123 U/L (ref 40–129)
ALT SERPL-CCNC: 16 U/L (ref 10–40)
ANION GAP SERPL CALCULATED.3IONS-SCNC: 11 MMOL/L (ref 3–16)
AST SERPL-CCNC: 18 U/L (ref 15–37)
BILIRUB SERPL-MCNC: 0.3 MG/DL (ref 0–1)
BUN SERPL-MCNC: 26 MG/DL (ref 7–20)
CALCIUM SERPL-MCNC: 9.5 MG/DL (ref 8.3–10.6)
CHLORIDE SERPL-SCNC: 103 MMOL/L (ref 99–110)
CO2 SERPL-SCNC: 27 MMOL/L (ref 21–32)
CREAT SERPL-MCNC: 0.7 MG/DL (ref 0.6–1.2)
DEPRECATED RDW RBC AUTO: 12.5 % (ref 12.4–15.4)
FERRITIN SERPL IA-MCNC: 105.5 NG/ML (ref 15–150)
FOLATE SERPL-MCNC: >20 NG/ML (ref 4.78–24.2)
GFR SERPLBLD CREATININE-BSD FMLA CKD-EPI: >60 ML/MIN/{1.73_M2}
GLUCOSE SERPL-MCNC: 107 MG/DL (ref 70–99)
HCT VFR BLD AUTO: 43 % (ref 36–48)
HGB BLD-MCNC: 14.3 G/DL (ref 12–16)
IRON SATN MFR SERPL: 35 % (ref 15–50)
IRON SERPL-MCNC: 91 UG/DL (ref 37–145)
MAGNESIUM SERPL-MCNC: 2.4 MG/DL (ref 1.8–2.4)
MCH RBC QN AUTO: 34.7 PG (ref 26–34)
MCHC RBC AUTO-ENTMCNC: 33.3 G/DL (ref 31–36)
MCV RBC AUTO: 104.4 FL (ref 80–100)
PLATELET # BLD AUTO: 260 K/UL (ref 135–450)
PMV BLD AUTO: 7.6 FL (ref 5–10.5)
POTASSIUM SERPL-SCNC: 4 MMOL/L (ref 3.5–5.1)
PROT SERPL-MCNC: 6.7 G/DL (ref 6.4–8.2)
RBC # BLD AUTO: 4.12 M/UL (ref 4–5.2)
SODIUM SERPL-SCNC: 141 MMOL/L (ref 136–145)
TIBC SERPL-MCNC: 263 UG/DL (ref 260–445)
TSH SERPL DL<=0.005 MIU/L-ACNC: 3.81 UIU/ML (ref 0.27–4.2)
VIT B12 SERPL-MCNC: 367 PG/ML (ref 211–911)
WBC # BLD AUTO: 6.2 K/UL (ref 4–11)

## 2023-11-30 ASSESSMENT — ENCOUNTER SYMPTOMS
WHEEZING: 0
CHEST TIGHTNESS: 0
SHORTNESS OF BREATH: 0
COUGH: 0

## 2023-11-30 NOTE — PROGRESS NOTES
11/30/23     Chief Complaint   Patient presents with    Orders     Would like blood work to check iron levels due to new plant based diet    Other     Declined flu vaccine     HPI    Pt is here for an acute visit today   Reports a few week history of intermittent dizziness / balance issues. Legs feel wobbly at times. Denies any falls. Comes and goes, not daily. Tends to be worse in the mornings. Happening 2-3 days a week. Not changing much since onset but might be a little better, not sure. Eating breakfast often helps with symptoms. Has trouble eating on a regular basis, meals are spread out more since her room mate was in the hospital in May. Recently changed to a plant based diet and is not sure if this is the issue. She was previously vegan without reported concerns. Right hip pain, has been ongoing but getting worse the past year. History of femur fracture requiring replacement. Hurts with certain movements. Has a fear of falling. Went to chiropractor yesterday which helps with back and hip. Had an episode yesterday when going from laying down to standing, which tends to exacerbate the balance. Allergies   Allergen Reactions    Statins      Myalgia         Current Outpatient Medications   Medication Sig Dispense Refill    MAGNESIUM PO       Multiple Vitamins-Minerals (MULTIVITAMIN WOMEN 50+ PO) Take 1 capsule by mouth daily      Ascorbic Acid (VITAMIN C) 500 MG tablet Take 1 tablet by mouth daily      VITAMIN D, CHOLECALCIFEROL, PO Take 5,000 Units by mouth       No current facility-administered medications for this visit. Review of Systems   Constitutional:  Negative for chills, fatigue and fever. Respiratory:  Negative for cough, chest tightness, shortness of breath and wheezing. Cardiovascular:  Negative for chest pain, palpitations and leg swelling. Neurological:  Positive for dizziness and weakness (balance). Negative for tremors, syncope, light-headedness and headaches.      Vitals:

## 2023-11-30 NOTE — ASSESSMENT & PLAN NOTE
Acute, intermittent. At the same time as dizziness which is exacerbated by fast movements and when hungry. Encouraged pt to eat small frequent meals. Work on hydration. Make an effort to move slowly especially with position changes. Checking blood work today.

## 2023-12-01 LAB
EST. AVERAGE GLUCOSE BLD GHB EST-MCNC: 116.9 MG/DL
HBA1C MFR BLD: 5.7 %

## 2023-12-04 NOTE — PROGRESS NOTES
Subjective:      Patient ID: Emmett Fleming is a 65 y.o. female. HPI    Review of Systems    Objective:   Physical Exam  Incision healing well  Assessment:      56-year-old female status post open left femoral hernia repair with mesh. Doing well postoperatively. Plan:      Continue lifting restrictions for 2 more weeks. Follow up on an as-needed basis.
90

## 2024-02-28 ENCOUNTER — PATIENT MESSAGE (OUTPATIENT)
Dept: INTERNAL MEDICINE CLINIC | Age: 84
End: 2024-02-28

## 2024-02-28 NOTE — TELEPHONE ENCOUNTER
From: Kerri Alves  To: Evelina Rizo  Sent: 2/28/2024 12:13 PM EST  Subject: Amoxicillin 500mg    I need to replenish my Amoxicillin that is required prior   to any dental appt, 2000mg per visit (hip replacements) I have two on hand, so only need six at this time. Trevin, [phone].....Thank you, Kerri Alves

## 2024-04-24 ENCOUNTER — TELEPHONE (OUTPATIENT)
Dept: INTERNAL MEDICINE CLINIC | Age: 84
End: 2024-04-24

## 2024-04-24 NOTE — TELEPHONE ENCOUNTER
Patient called in stating that she took a home COVID test and it came back positive. She is wondering if Evelina will send Paxlovid to her pharmacy. Please advise.

## 2024-04-25 ENCOUNTER — TELEMEDICINE (OUTPATIENT)
Dept: INTERNAL MEDICINE CLINIC | Age: 84
End: 2024-04-25
Payer: MEDICARE

## 2024-04-25 DIAGNOSIS — R06.7 SNEEZING: ICD-10-CM

## 2024-04-25 DIAGNOSIS — U07.1 COVID-19: Primary | ICD-10-CM

## 2024-04-25 DIAGNOSIS — J00 ACUTE RHINITIS: ICD-10-CM

## 2024-04-25 PROCEDURE — 1123F ACP DISCUSS/DSCN MKR DOCD: CPT | Performed by: INTERNAL MEDICINE

## 2024-04-25 PROCEDURE — 99213 OFFICE O/P EST LOW 20 MIN: CPT | Performed by: INTERNAL MEDICINE

## 2024-04-25 RX ORDER — FLUTICASONE PROPIONATE 50 MCG
1 SPRAY, SUSPENSION (ML) NASAL DAILY
Qty: 16 G | Refills: 0 | Status: SHIPPED | OUTPATIENT
Start: 2024-04-25

## 2024-04-25 ASSESSMENT — ENCOUNTER SYMPTOMS
RHINORRHEA: 1
NAUSEA: 0
PHOTOPHOBIA: 0
WHEEZING: 0
COUGH: 1
VOMITING: 0
SHORTNESS OF BREATH: 0
TROUBLE SWALLOWING: 0
ABDOMINAL PAIN: 0
VOICE CHANGE: 0

## 2024-04-25 ASSESSMENT — PATIENT HEALTH QUESTIONNAIRE - PHQ9
SUM OF ALL RESPONSES TO PHQ QUESTIONS 1-9: 0
1. LITTLE INTEREST OR PLEASURE IN DOING THINGS: NOT AT ALL
SUM OF ALL RESPONSES TO PHQ QUESTIONS 1-9: 0
SUM OF ALL RESPONSES TO PHQ9 QUESTIONS 1 & 2: 0
2. FEELING DOWN, DEPRESSED OR HOPELESS: NOT AT ALL

## 2024-04-25 NOTE — PROGRESS NOTES
2024    TELEHEALTH EVALUATION -- Audio/Visual    HPI:    Kerri Alves (:  1940) has requested an audio/video evaluation for the following concern(s):      Chief Complaint   Patient presents with    Positive For Covid-19     Pt tested positive for covid yesterday. Pt is having headaches, taken tylenol to help, runny nose, cough for about 3 days.     Patient has been complaining of dry cough, congestion, sore throat, nasal drainage, headache for the last 3 days.  She tested positive for COVID-19.  She denies high fever, shortness of breath, wheezing chest pain dizziness nausea vomiting.  Patient home pulse oximetry has been excellent 98  Review of Systems   Constitutional:  Negative for diaphoresis and unexpected weight change.   HENT:  Positive for congestion and rhinorrhea. Negative for trouble swallowing and voice change.    Eyes:  Negative for photophobia and visual disturbance.   Respiratory:  Positive for cough. Negative for shortness of breath and wheezing.    Cardiovascular:  Negative for chest pain and palpitations.   Gastrointestinal:  Negative for abdominal pain, nausea and vomiting.   Endocrine: Negative for polyphagia and polyuria.   Neurological:  Positive for headaches. Negative for dizziness, seizures, syncope, facial asymmetry and speech difficulty.       Prior to Visit Medications    Medication Sig Taking? Authorizing Provider   nirmatrelvir/ritonavir 300/100 (PAXLOVID) 20 x 150 MG & 10 x 100MG TBPK Take 3 tablets (two 150 mg nirmatrelvir and one 100 mg ritonavir tablets) by mouth every 12 hours for 5 days. Yes KEVIN Chavez MD   fluticasone (FLONASE) 50 MCG/ACT nasal spray 1 spray by Each Nostril route daily Yes KEVIN Chavez MD   MAGNESIUM PO  Yes ProviderMando MD   Multiple Vitamins-Minerals (MULTIVITAMIN WOMEN 50+ PO) Take 1 capsule by mouth daily Yes Mando Graves MD   Ascorbic Acid (VITAMIN C) 500 MG tablet Take 1 tablet by mouth daily Yes Provider

## 2024-07-01 ENCOUNTER — PATIENT MESSAGE (OUTPATIENT)
Dept: INTERNAL MEDICINE CLINIC | Age: 84
End: 2024-07-01

## 2024-07-02 NOTE — TELEPHONE ENCOUNTER
Sarah is requesting a fax request for those records. Pt has to come in to sign a records release.

## 2024-07-12 SDOH — ECONOMIC STABILITY: INCOME INSECURITY: HOW HARD IS IT FOR YOU TO PAY FOR THE VERY BASICS LIKE FOOD, HOUSING, MEDICAL CARE, AND HEATING?: SOMEWHAT HARD

## 2024-07-12 SDOH — HEALTH STABILITY: PHYSICAL HEALTH: ON AVERAGE, HOW MANY DAYS PER WEEK DO YOU ENGAGE IN MODERATE TO STRENUOUS EXERCISE (LIKE A BRISK WALK)?: 4 DAYS

## 2024-07-12 SDOH — ECONOMIC STABILITY: FOOD INSECURITY: WITHIN THE PAST 12 MONTHS, THE FOOD YOU BOUGHT JUST DIDN'T LAST AND YOU DIDN'T HAVE MONEY TO GET MORE.: NEVER TRUE

## 2024-07-12 SDOH — ECONOMIC STABILITY: HOUSING INSECURITY
IN THE LAST 12 MONTHS, WAS THERE A TIME WHEN YOU DID NOT HAVE A STEADY PLACE TO SLEEP OR SLEPT IN A SHELTER (INCLUDING NOW)?: NO

## 2024-07-12 SDOH — ECONOMIC STABILITY: TRANSPORTATION INSECURITY
IN THE PAST 12 MONTHS, HAS LACK OF TRANSPORTATION KEPT YOU FROM MEETINGS, WORK, OR FROM GETTING THINGS NEEDED FOR DAILY LIVING?: NO

## 2024-07-12 SDOH — HEALTH STABILITY: PHYSICAL HEALTH: ON AVERAGE, HOW MANY MINUTES DO YOU ENGAGE IN EXERCISE AT THIS LEVEL?: 20 MIN

## 2024-07-12 SDOH — ECONOMIC STABILITY: FOOD INSECURITY: WITHIN THE PAST 12 MONTHS, YOU WORRIED THAT YOUR FOOD WOULD RUN OUT BEFORE YOU GOT MONEY TO BUY MORE.: NEVER TRUE

## 2024-07-12 ASSESSMENT — PATIENT HEALTH QUESTIONNAIRE - PHQ9
SUM OF ALL RESPONSES TO PHQ QUESTIONS 1-9: 0
SUM OF ALL RESPONSES TO PHQ QUESTIONS 1-9: 0
1. LITTLE INTEREST OR PLEASURE IN DOING THINGS: NOT AT ALL
SUM OF ALL RESPONSES TO PHQ QUESTIONS 1-9: 0
SUM OF ALL RESPONSES TO PHQ9 QUESTIONS 1 & 2: 0
SUM OF ALL RESPONSES TO PHQ QUESTIONS 1-9: 0
2. FEELING DOWN, DEPRESSED OR HOPELESS: NOT AT ALL

## 2024-07-12 ASSESSMENT — LIFESTYLE VARIABLES
HOW MANY STANDARD DRINKS CONTAINING ALCOHOL DO YOU HAVE ON A TYPICAL DAY: 0
HOW OFTEN DO YOU HAVE SIX OR MORE DRINKS ON ONE OCCASION: 1
HOW OFTEN DO YOU HAVE A DRINK CONTAINING ALCOHOL: NEVER
HOW MANY STANDARD DRINKS CONTAINING ALCOHOL DO YOU HAVE ON A TYPICAL DAY: PATIENT DOES NOT DRINK
HOW OFTEN DO YOU HAVE A DRINK CONTAINING ALCOHOL: 1

## 2024-07-15 ENCOUNTER — OFFICE VISIT (OUTPATIENT)
Dept: INTERNAL MEDICINE CLINIC | Age: 84
End: 2024-07-15
Payer: MEDICARE

## 2024-07-15 VITALS
HEART RATE: 58 BPM | WEIGHT: 122 LBS | DIASTOLIC BLOOD PRESSURE: 60 MMHG | HEIGHT: 63 IN | OXYGEN SATURATION: 96 % | BODY MASS INDEX: 21.62 KG/M2 | SYSTOLIC BLOOD PRESSURE: 126 MMHG

## 2024-07-15 DIAGNOSIS — M25.551 CHRONIC RIGHT HIP PAIN: ICD-10-CM

## 2024-07-15 DIAGNOSIS — E55.9 VITAMIN D DEFICIENCY: ICD-10-CM

## 2024-07-15 DIAGNOSIS — R73.09 ELEVATED GLUCOSE: ICD-10-CM

## 2024-07-15 DIAGNOSIS — E53.8 B12 DEFICIENCY: ICD-10-CM

## 2024-07-15 DIAGNOSIS — Z96.641 HISTORY OF TOTAL RIGHT HIP ARTHROPLASTY: ICD-10-CM

## 2024-07-15 DIAGNOSIS — R26.89 BALANCE PROBLEM: ICD-10-CM

## 2024-07-15 DIAGNOSIS — M81.0 AGE RELATED OSTEOPOROSIS, UNSPECIFIED PATHOLOGICAL FRACTURE PRESENCE: ICD-10-CM

## 2024-07-15 DIAGNOSIS — E78.2 MIXED HYPERLIPIDEMIA: ICD-10-CM

## 2024-07-15 DIAGNOSIS — Z00.00 MEDICARE ANNUAL WELLNESS VISIT, SUBSEQUENT: Primary | ICD-10-CM

## 2024-07-15 DIAGNOSIS — Z87.81 HISTORY OF FEMUR FRACTURE: ICD-10-CM

## 2024-07-15 DIAGNOSIS — G89.29 CHRONIC RIGHT HIP PAIN: ICD-10-CM

## 2024-07-15 LAB
25(OH)D3 SERPL-MCNC: 56.1 NG/ML
ANION GAP SERPL CALCULATED.3IONS-SCNC: 11 MMOL/L (ref 3–16)
BUN SERPL-MCNC: 21 MG/DL (ref 7–20)
CALCIUM SERPL-MCNC: 9.8 MG/DL (ref 8.3–10.6)
CHLORIDE SERPL-SCNC: 102 MMOL/L (ref 99–110)
CHOLEST SERPL-MCNC: 218 MG/DL (ref 0–199)
CO2 SERPL-SCNC: 27 MMOL/L (ref 21–32)
CREAT SERPL-MCNC: 0.8 MG/DL (ref 0.6–1.2)
DEPRECATED RDW RBC AUTO: 12.9 % (ref 12.4–15.4)
EST. AVERAGE GLUCOSE BLD GHB EST-MCNC: 114 MG/DL
GFR SERPLBLD CREATININE-BSD FMLA CKD-EPI: 72 ML/MIN/{1.73_M2}
GLUCOSE SERPL-MCNC: 91 MG/DL (ref 70–99)
HBA1C MFR BLD: 5.6 %
HCT VFR BLD AUTO: 41.5 % (ref 36–48)
HDLC SERPL-MCNC: 82 MG/DL (ref 40–60)
HGB BLD-MCNC: 13.8 G/DL (ref 12–16)
LDLC SERPL CALC-MCNC: 120 MG/DL
MCH RBC QN AUTO: 34.9 PG (ref 26–34)
MCHC RBC AUTO-ENTMCNC: 33.4 G/DL (ref 31–36)
MCV RBC AUTO: 104.5 FL (ref 80–100)
PLATELET # BLD AUTO: 250 K/UL (ref 135–450)
PMV BLD AUTO: 7.5 FL (ref 5–10.5)
POTASSIUM SERPL-SCNC: 4.5 MMOL/L (ref 3.5–5.1)
RBC # BLD AUTO: 3.97 M/UL (ref 4–5.2)
SODIUM SERPL-SCNC: 140 MMOL/L (ref 136–145)
TRIGL SERPL-MCNC: 82 MG/DL (ref 0–150)
VIT B12 SERPL-MCNC: 469 PG/ML (ref 211–911)
VLDLC SERPL CALC-MCNC: 16 MG/DL
WBC # BLD AUTO: 4.9 K/UL (ref 4–11)

## 2024-07-15 PROCEDURE — G0439 PPPS, SUBSEQ VISIT: HCPCS | Performed by: NURSE PRACTITIONER

## 2024-07-15 PROCEDURE — 1123F ACP DISCUSS/DSCN MKR DOCD: CPT | Performed by: NURSE PRACTITIONER

## 2024-07-15 PROCEDURE — 99214 OFFICE O/P EST MOD 30 MIN: CPT | Performed by: NURSE PRACTITIONER

## 2024-07-15 ASSESSMENT — PATIENT HEALTH QUESTIONNAIRE - PHQ9
1. LITTLE INTEREST OR PLEASURE IN DOING THINGS: NOT AT ALL
SUM OF ALL RESPONSES TO PHQ QUESTIONS 1-9: 0
2. FEELING DOWN, DEPRESSED OR HOPELESS: NOT AT ALL
SUM OF ALL RESPONSES TO PHQ9 QUESTIONS 1 & 2: 0
SUM OF ALL RESPONSES TO PHQ QUESTIONS 1-9: 0

## 2024-07-15 ASSESSMENT — LIFESTYLE VARIABLES
HOW OFTEN DO YOU HAVE A DRINK CONTAINING ALCOHOL: NEVER
HOW MANY STANDARD DRINKS CONTAINING ALCOHOL DO YOU HAVE ON A TYPICAL DAY: PATIENT DOES NOT DRINK

## 2024-07-15 NOTE — PROGRESS NOTES
Medicare Annual Wellness Visit    Kerri Alves is here for Medicare AWV (Right hip painful on and off )    Assessment & Plan   Medicare annual wellness visit, subsequent  Assessment & Plan:  Routine AWV   HM reviewed   Chronic right hip pain  Assessment & Plan:  Chronic, uncontrolled. Discussed options in detail. Checking xray. Trial of PT. Consider seeing ortho pending xray results or if not improving as anticipated.   Orders:  -     XR HIP RIGHT (2-3 VIEWS); Future  -     Ambulatory referral to Physical Therapy  History of femur fracture  Assessment & Plan:   Chronic, uncontrolled pain. Discussed options in detail. Checking xray. Trial of PT. Consider seeing ortho pending xray results or if not improving as anticipated.   Orders:  -     XR HIP RIGHT (2-3 VIEWS); Future  -     Ambulatory referral to Physical Therapy  History of total right hip arthroplasty  Assessment & Plan:   Chronic, uncontrolled pain. Discussed options in detail. Checking xray. Trial of PT. Consider seeing ortho pending xray results or if not improving as anticipated.   Orders:  -     XR HIP RIGHT (2-3 VIEWS); Future  -     Ambulatory referral to Physical Therapy  Vitamin D deficiency  Assessment & Plan:  Chronic, stable   Continue Vit D supplementation   Recheck Vit D levels   Orders:  -     Vitamin D 25 Hydroxy; Future  Balance problem  Mixed hyperlipidemia  Assessment & Plan:  Chronic, stable  Recheck lipid panel today   Not currently on medication    Orders:  -     Lipid Panel; Future  Age related osteoporosis, unspecified pathological fracture presence  Assessment & Plan:  Chronic, not currently on medication. Repeat dexa ordered, call to schedule.   Orders:  -     DEXA BONE DENSITY AXIAL SKELETON; Future  Elevated glucose  Assessment & Plan:  Chronic, stable, diet controlled   recheck Hgb A1c   Orders:  -     Hemoglobin A1C; Future  -     Basic Metabolic Panel; Future  B12 deficiency  Assessment & Plan:  Chronic. Repeating blood

## 2024-07-15 NOTE — ASSESSMENT & PLAN NOTE
Chronic, uncontrolled. Discussed options in detail. Checking xray. Trial of PT. Consider seeing ortho pending xray results or if not improving as anticipated.

## 2024-07-15 NOTE — ASSESSMENT & PLAN NOTE
Chronic, uncontrolled pain. Discussed options in detail. Checking xray. Trial of PT. Consider seeing ortho pending xray results or if not improving as anticipated.

## 2024-07-18 ENCOUNTER — HOSPITAL ENCOUNTER (OUTPATIENT)
Dept: GENERAL RADIOLOGY | Age: 84
Discharge: HOME | End: 2024-07-18
Payer: MEDICARE

## 2024-07-18 ENCOUNTER — HOSPITAL ENCOUNTER (OUTPATIENT)
Age: 84
Discharge: HOME | End: 2024-07-18
Payer: MEDICARE

## 2024-07-18 DIAGNOSIS — G89.29 CHRONIC RIGHT HIP PAIN: ICD-10-CM

## 2024-07-18 DIAGNOSIS — Z87.81 HISTORY OF FEMUR FRACTURE: ICD-10-CM

## 2024-07-18 DIAGNOSIS — Z96.641 HISTORY OF TOTAL RIGHT HIP ARTHROPLASTY: ICD-10-CM

## 2024-07-18 DIAGNOSIS — M25.551 CHRONIC RIGHT HIP PAIN: ICD-10-CM

## 2024-07-18 PROCEDURE — 73502 X-RAY EXAM HIP UNI 2-3 VIEWS: CPT

## 2024-07-30 NOTE — PLAN OF CARE
VM from pt - 9/19/22 will not work for her afterall  Asked about 10/3/22  LMOM for pt - offered Monday, 10/3/22, at [facility], with Dr Rafa Lucero  Advised pt that procedure will be rescheduled to 10/3/22  If pt needs to reschedule again, may callback directly      Scheduled date of colonoscopy (as of today):  10/3/22  Physician performing colonoscopy:  Dr Rafa Lucero  Location of colonoscopy:  [facility]  Bowel prep reviewed with patient:  2 day Golytely  Instructions reviewed with patient by:  Dennis Lennon - mailed to pt's home  Clearances: n/a limitations, and/or participation restrictions  [x] A clinical presentation with stable and/or uncomplicated characteristics   [x] Clinical decision making of LOW (80381 - Typically 20 minutes face-to-face) complexity using standardized patient assessment instrument and/or measurable assessment of functional outcome.    Today's Assessment: See above    Medical Necessity Documentation:  I certify that this patient meets the below criteria necessary for medical necessity for care and/or justification of therapy services:  The patient has functional impairments and/or activity limitations and would benefit from continued outpatient therapy services to address the deficits outlined in the patients goals    Return to Play: NA    Prognosis for POC: [x] Good [] Fair  [] Poor    Patient requires continued skilled intervention: [x] Yes  [] No      CHARGE CAPTURE     PT CHARGE GRID   CPT Code (TIMED) minutes # CPT Code (UNTIMED) #     Therex (44917)  11 1  EVAL:LOW (79923 - Typically 20 minutes face-to-face) 1    Neuromusc. Re-ed (85487)    Re-Eval (19923)     Manual (69244)    Estim Unattended (25967)     Ther. Act (75003) 12 1  Mech. Traction (29720)     Gait (13489)    Dry Needle 1-2 muscle (56518)     Aquatic Therex (98188)    Dry Needle 3+ muscle (20561)     Iontophoresis (29594)    VASO (05947)     Ultrasound (20502)    Group Therapy (49148)     Estim Attended (00293)    Canalith Repositioning (04512)     Other:    Other:    Total Timed Code Tx Minutes 23 2  20     Total Treatment Minutes 43        Charge Justification:  (06056) THERAPEUTIC EXERCISE - Provided verbal/tactile cueing for activities related to strengthening, flexibility, endurance, ROM performed to prevent loss of range of motion, maintain or improve muscular strength or increase flexibility, following either an injury or surgery.   (65375) HOME EXERCISE PROGRAM - Reviewed/Progressed HEP activities related to strengthening, flexibility, endurance, ROM

## 2024-07-31 ENCOUNTER — HOSPITAL ENCOUNTER (OUTPATIENT)
Dept: PHYSICAL THERAPY | Age: 84
Setting detail: THERAPIES SERIES
Discharge: HOME | End: 2024-07-31
Payer: MEDICARE

## 2024-07-31 DIAGNOSIS — R26.89 IMBALANCE: ICD-10-CM

## 2024-07-31 DIAGNOSIS — R29.898 HIP WEAKNESS: Primary | ICD-10-CM

## 2024-07-31 DIAGNOSIS — Z74.09 DECREASED FUNCTIONAL MOBILITY AND ENDURANCE: ICD-10-CM

## 2024-07-31 PROCEDURE — 97530 THERAPEUTIC ACTIVITIES: CPT

## 2024-07-31 PROCEDURE — 97110 THERAPEUTIC EXERCISES: CPT

## 2024-07-31 PROCEDURE — 97161 PT EVAL LOW COMPLEX 20 MIN: CPT

## 2024-08-08 ENCOUNTER — HOSPITAL ENCOUNTER (OUTPATIENT)
Dept: PHYSICAL THERAPY | Age: 84
Setting detail: THERAPIES SERIES
Discharge: HOME | End: 2024-08-08
Payer: MEDICARE

## 2024-08-08 PROCEDURE — 97110 THERAPEUTIC EXERCISES: CPT

## 2024-08-08 PROCEDURE — 97140 MANUAL THERAPY 1/> REGIONS: CPT

## 2024-08-08 NOTE — FLOWSHEET NOTE
Tissue Mobilization, and Trigger Point Release  Modalities as needed that may include: Cryotherapy, Electrical Stimulation, Biofeedback, Thermal Agents, and Ultrasound  Patient education on joint protection, postural re-education, activity modification, and progression of HEP    Plan: Cont POC- Continue emphasis/focus on exercise progression and increasing ROM. Next visit plan to progress weights, progress reps, add new exercises, and progress balance     Electronically Signed by Kaleigh Valdivia PT, DPT  Date: 08/08/2024     Note: Portions of this note have been templated and/or copied from initial evaluation, reassessments and prior notes for documentation efficiency.    Note: If patient does not return for scheduled/recommended follow up visits, this note will serve as a discharge from care along with the most recent update on progress.    Ortho Evaluation

## 2024-08-10 ENCOUNTER — HOSPITAL ENCOUNTER (OUTPATIENT)
Dept: PHYSICAL THERAPY | Age: 84
Setting detail: THERAPIES SERIES
Discharge: HOME | End: 2024-08-10
Payer: MEDICARE

## 2024-08-10 PROCEDURE — 97530 THERAPEUTIC ACTIVITIES: CPT

## 2024-08-10 PROCEDURE — 97110 THERAPEUTIC EXERCISES: CPT

## 2024-08-10 PROCEDURE — 97140 MANUAL THERAPY 1/> REGIONS: CPT

## 2024-08-10 NOTE — FLOWSHEET NOTE
[facility] - Outpatient Rehabilitation and Therapy [address], [place] [address] office: [phone] fax: [phone]      Physical Therapy: TREATMENT/PROGRESS NOTE   Patient: Kerri Alves (84 y.o. female)   Examination Date: 08/10/2024   :  1940 MRN: [id number]   Visit #: 3   Insurance Allowable Auth Needed   [place] []Yes    [x]No    Insurance: Payor: AETNA MEDICARE / Plan: AETNA MEDICARE-ADVANTAGE PPO / Product Type: Medicare /   Insurance ID: [id number] - (Medicare Managed)  Secondary Insurance (if applicable):    Treatment Diagnosis:     ICD-10-CM    1. Hip weakness  R29.898       2. Decreased functional mobility and endurance  Z74.09       3. Imbalance  R26.89          Medical Diagnosis:  Chronic right hip pain [M25.551, G89.29]  History of femur fracture [Z87.81]  History of total right hip arthroplasty [Z96.641]   Referring Physician: Evelina Rizo APRN *  PCP: Evelina Rizo APRN - CNP     Plan of care signed (Y/N): YES    Date of Patient follow up with Physician:      Plan of Care Report: NO  POC update due: (10 visits /OR AUTH LIMITS, whichever is less)  2024                                             Medical History:  Comorbidities:  Other:    Relevant Medical History: B KONSTANTIN, Osteoporosis, balance                                          Precautions/ Contra-indications:           Latex allergy:  NO  Pacemaker:    NO  Contraindications for Manipulation: None  Date of Surgery: NA  Other:    Red Flags:  None    Suicide Screening:   The patient did not verbalize a primary behavioral concern, suicidal ideation, suicidal intent, or demonstrate suicidal behaviors.    Preferred Language for Healthcare:   [x] English       [] other:    SUBJECTIVE EXAMINATION   Current:  No increase in pain but is having increased muscle soreness since last session. Feels like the stretching from last session helped.     Eval:  Patient stated complaint:  Patient reports that

## 2024-08-12 ENCOUNTER — HOSPITAL ENCOUNTER (OUTPATIENT)
Dept: PHYSICAL THERAPY | Age: 84
Setting detail: THERAPIES SERIES
Discharge: HOME | End: 2024-08-12
Payer: MEDICARE

## 2024-08-12 PROCEDURE — 97110 THERAPEUTIC EXERCISES: CPT

## 2024-08-12 PROCEDURE — 97530 THERAPEUTIC ACTIVITIES: CPT

## 2024-08-12 PROCEDURE — 97112 NEUROMUSCULAR REEDUCATION: CPT

## 2024-08-12 NOTE — FLOWSHEET NOTE
Frequency/Duration: 2x/week for 4 weeks for the following treatment interventions:    Interventions:  Therapeutic Exercise (03004) including: strength training, ROM, and functional mobility  Therapeutic Activities (07231) including: functional mobility training and education.  Neuromuscular Re-education (92307) activation and proprioception, including postural re-education.    Gait Training (24909) for normalization of ambulation patterns and AD training.   Manual Therapy (54012) as indicated to include: Passive Range of Motion, Gr I-IV mobilizations, Soft Tissue Mobilization, and Trigger Point Release  Modalities as needed that may include: Cryotherapy, Electrical Stimulation, Biofeedback, Thermal Agents, and Ultrasound  Patient education on joint protection, postural re-education, activity modification, and progression of HEP    Plan: Cont POC- Continue emphasis/focus on exercise progression and increasing ROM. Next visit plan to progress weights, progress reps, add new exercises, and progress balance     Electronically Signed by Zachary Sun PT, DPT  Date: 08/12/2024     Note: Portions of this note have been templated and/or copied from initial evaluation, reassessments and prior notes for documentation efficiency.    Note: If patient does not return for scheduled/recommended follow up visits, this note will serve as a discharge from care along with the most recent update on progress.    Ortho Evaluation

## 2024-08-14 ENCOUNTER — HOSPITAL ENCOUNTER (OUTPATIENT)
Dept: PHYSICAL THERAPY | Age: 84
Setting detail: THERAPIES SERIES
Discharge: HOME | End: 2024-08-14
Payer: MEDICARE

## 2024-08-14 PROBLEM — Z00.00 MEDICARE ANNUAL WELLNESS VISIT, SUBSEQUENT: Status: RESOLVED | Noted: 2023-07-13 | Resolved: 2024-08-14

## 2024-08-14 PROCEDURE — 97110 THERAPEUTIC EXERCISES: CPT

## 2024-08-14 PROCEDURE — 97112 NEUROMUSCULAR REEDUCATION: CPT

## 2024-08-14 NOTE — FLOWSHEET NOTE
8/10: added   Mini lunge star CGA  8/12 , Gait belt 8/10: added                                      Manual Intervention (83700)  TIME     PROM R hip  -flexion                                    NMR re-education (67935) resistance Sets/time Reps CUES NEEDED   Tandem on airex  3 x 30\" B  8/14, 8/10: added   Toe taps       Stepping over hurtles  fwd/bwd - two feet   8/12                 Therapeutic Activity (05182)  Sets/time     Seated hip rainbows with cone 8/10: added   Step lift offs 8\" 2 15 B 8/14, 8/10: added   Gliders: lateral/reverse lunges   10 B 8/14                   Modalities:    No modalities applied this session    Education/Home Exercise Program: Access Code: [email or web address]  8/10:  Access Code: [email or web address]  URL: [email or web address]  Date: 08/10/2024  Prepared by: Kaleigh Valdivia    Exercises  - Hip Abduction with Resistance Loop  - 1 x daily - 7 x weekly - 2-3 sets - 10 reps  - Standing Hip Flexion with Resistance Loop  - 1 x daily - 7 x weekly - 2-3 sets - 10 reps  - Hip Extension with Resistance Loop  - 1 x daily - 7 x weekly - 2-3 sets - 10 reps    URL: [email or web address]  Date: 07/31/2024  Prepared by: Zachary Sun    Exercises  - Supine Bridge  - 1 x daily - 7 x weekly - 2 sets - 10 reps  - Clamshell  - 1 x daily - 7 x weekly - 2 sets - 10 reps  - Standing Hamstring Stretch on Chair  - 1 x daily - 7 x weekly - 1 sets - 2 reps - 20s hold  - Standing Hip Flexor Stretch  - 1 x daily - 7 x weekly - 1 sets - 2 reps - 20s hold  - Hip Flexor Stretch on Step  - 1 x daily - 7 x weekly - 1 sets - 2 reps - 20s hold        ASSESSMENT     Today's Assessment:  Session began late this date. The patient continued with hip strengthening CKC exercises. Added gliders lateral /reverse lunges with good technique and response. She tolerated increase reps step up lift offs. Will further progress hip strengthening and balance  .     Medical Necessity Documentation:  I certify that this patient meets the below

## 2024-08-19 ENCOUNTER — HOSPITAL ENCOUNTER (OUTPATIENT)
Dept: PHYSICAL THERAPY | Age: 84
Setting detail: THERAPIES SERIES
Discharge: HOME | End: 2024-08-19
Payer: MEDICARE

## 2024-08-19 PROCEDURE — 97530 THERAPEUTIC ACTIVITIES: CPT

## 2024-08-19 PROCEDURE — 97110 THERAPEUTIC EXERCISES: CPT

## 2024-08-19 NOTE — FLOWSHEET NOTE
[facility] - Outpatient Rehabilitation and Therapy [address], [place] [address] office: [phone] fax: [phone]      Physical Therapy: TREATMENT/PROGRESS NOTE   Patient: Kerir Alves (84 y.o. female)   Examination Date: 2024   :  1940 MRN: [id number]   Visit #: 6   Insurance Allowable Auth Needed   MN []Yes    [x]No    Insurance: Payor: AETNA MEDICARE / Plan: AETNA MEDICARE-ADVANTAGE PPO / Product Type: Medicare /   Insurance ID: [id number] - (Medicare Managed)  Secondary Insurance (if applicable):    Treatment Diagnosis:     ICD-10-CM    1. Hip weakness  R29.898       2. Decreased functional mobility and endurance  Z74.09       3. Imbalance  R26.89          Medical Diagnosis:  Chronic right hip pain [M25.551, G89.29]  History of femur fracture [Z87.81]  History of total right hip arthroplasty [Z96.641]   Referring Physician: Evelina Rizo APRN *  PCP: Evelina Rizo APRN - CNP     Plan of care signed (Y/N): YES    Date of Patient follow up with Physician:      Plan of Care Report: NO  POC update due: (10 visits /OR AUTH LIMITS, whichever is less)  2024                                             Medical History:  Comorbidities:  Other:    Relevant Medical History: B KONSTANTIN, Osteoporosis, balance                                          Precautions/ Contra-indications:           Latex allergy:  NO  Pacemaker:    NO  Contraindications for Manipulation: None  Date of Surgery: NA  Other:    Red Flags:  None    Suicide Screening:   The patient did not verbalize a primary behavioral concern, suicidal ideation, suicidal intent, or demonstrate suicidal behaviors.    Preferred Language for Healthcare:   [x] English       [] other:    SUBJECTIVE EXAMINATION   Current:  Patient  reports that on Saturday she had some hip pain waking up in the morning.     Patient denies any symptoms this date.     No increase in pain but is having increased muscle soreness

## 2024-08-21 ENCOUNTER — HOSPITAL ENCOUNTER (OUTPATIENT)
Dept: PHYSICAL THERAPY | Age: 84
Setting detail: THERAPIES SERIES
Discharge: HOME | End: 2024-08-21
Payer: MEDICARE

## 2024-08-21 PROCEDURE — 97110 THERAPEUTIC EXERCISES: CPT

## 2024-08-21 PROCEDURE — 97530 THERAPEUTIC ACTIVITIES: CPT

## 2024-08-21 NOTE — FLOWSHEET NOTE
[facility] - Outpatient Rehabilitation and Therapy [address], [place] [address] office: [phone] fax: [phone]      Physical Therapy: TREATMENT/PROGRESS NOTE   Patient: Kerri Alves (84 y.o. female)   Examination Date: 2024   :  1940 MRN: [id number]   Visit #: 7   Insurance Allowable Auth Needed   MN []Yes    [x]No    Insurance: Payor: AETNA MEDICARE / Plan: AETNA MEDICARE-ADVANTAGE PPO / Product Type: Medicare /   Insurance ID: [id number] - (Medicare Managed)  Secondary Insurance (if applicable):    Treatment Diagnosis:     ICD-10-CM    1. Hip weakness  R29.898       2. Decreased functional mobility and endurance  Z74.09       3. Imbalance  R26.89          Medical Diagnosis:  Chronic right hip pain [M25.551, G89.29]  History of femur fracture [Z87.81]  History of total right hip arthroplasty [Z96.641]   Referring Physician: Evelina Rizo APRN *  PCP: Evelina Rizo APRN - CNP     Plan of care signed (Y/N): YES    Date of Patient follow up with Physician:      Plan of Care Report: NO  POC update due: (10 visits /OR AUTH LIMITS, whichever is less)  2024                                             Medical History:  Comorbidities:  Other:    Relevant Medical History: B KONSATNTIN, Osteoporosis, balance                                          Precautions/ Contra-indications:           Latex allergy:  NO  Pacemaker:    NO  Contraindications for Manipulation: None  Date of Surgery: NA  Other:    Red Flags:  None    Suicide Screening:   The patient did not verbalize a primary behavioral concern, suicidal ideation, suicidal intent, or demonstrate suicidal behaviors.    Preferred Language for Healthcare:   [x] English       [] other:    SUBJECTIVE EXAMINATION   Current: Pt reports that she is doing well, mild soreness but no increase in pain.       Eval:  Patient stated complaint:  Patient reports that she's been dealing R hip pain for > 3 years. She noticed

## 2024-08-26 ENCOUNTER — HOSPITAL ENCOUNTER (OUTPATIENT)
Dept: PHYSICAL THERAPY | Age: 84
Setting detail: THERAPIES SERIES
Discharge: HOME | End: 2024-08-26
Payer: MEDICARE

## 2024-08-26 PROCEDURE — 97112 NEUROMUSCULAR REEDUCATION: CPT

## 2024-08-26 PROCEDURE — 97110 THERAPEUTIC EXERCISES: CPT

## 2024-08-26 PROCEDURE — 97530 THERAPEUTIC ACTIVITIES: CPT

## 2024-08-26 NOTE — FLOWSHEET NOTE
[facility] - [facility] Therapy [address], [place] [address] office: [phone] fax: [phone]      Physical Therapy: TREATMENT/PROGRESS NOTE   Patient: Kerri Alves (84 y.o. female)   Examination Date: 2024   :  1940 MRN: [id number]   Visit #: 8   Insurance Allowable Auth Needed   [place] []Yes    [x]No    Insurance: Payor: AETNA MEDICARE / Plan: AETNA MEDICARE-ADVANTAGE PPO / Product Type: Medicare /   Insurance ID: [id number] - (Medicare Managed)  Secondary Insurance (if applicable):    Treatment Diagnosis:     ICD-10-CM    1. Hip weakness  R29.898       2. Decreased functional mobility and endurance  Z74.09       3. Imbalance  R26.89          Medical Diagnosis:  Chronic right hip pain [M25.551, G89.29]  History of femur fracture [Z87.81]  History of total right hip arthroplasty [Z96.641]   Referring Physician: Evelina Rizo APRN *  PCP: Evelina Rizo APRN - CNP     Plan of care signed (Y/N): YES    Date of Patient follow up with Physician:      Plan of Care Report: NO  POC update due: (10 visits /OR AUTH LIMITS, whichever is less)  2024                                             Medical History:  Comorbidities:  Other:    Relevant Medical History: B KONSTANTIN, Osteoporosis, balance                                          Precautions/ Contra-indications:           Latex allergy:  NO  Pacemaker:    NO  Contraindications for Manipulation: None  Date of Surgery: NA  Other:    Red Flags:  None    Suicide Screening:   The patient did not verbalize a primary behavioral concern, suicidal ideation, suicidal intent, or demonstrate suicidal behaviors.    Preferred Language for Healthcare:   [x] English       [] other:    SUBJECTIVE EXAMINATION   Current: Pt reports that she is doing well, mild soreness but no increase in pain.       Eval:  Patient stated complaint:  Patient reports that she's been dealing R hip pain for > 3 years. She noticed  further benefit from hip strengthening and balance training.    Medical Necessity Documentation:  I certify that this patient meets the below criteria necessary for medical necessity for care and/or justification of therapy services:  The patient has functional impairments and/or activity limitations and would benefit from continued outpatient therapy services to address the deficits outlined in the patients goals    Return to Play: NA    Prognosis for POC: [x] Good [] Fair  [] Poor    Patient requires continued skilled intervention: [x] Yes  [] No      CHARGE CAPTURE     PT CHARGE GRID   CPT Code (TIMED) minutes # CPT Code (UNTIMED) #     Therex (87574)  23 1  EVAL:LOW (44063 - Typically 20 minutes face-to-face)     Neuromusc. Re-ed (76910) 8 1  Re-Eval (33316)     Manual (46650)    Estim Unattended (62750)     Ther. Act (77834) 7 1  Mech. Traction (04527)     Gait (18850)    Dry Needle 1-2 muscle (97250)     Aquatic Therex (59667)    Dry Needle 3+ muscle (20561)     Iontophoresis (36928)    VASO (66028)     Ultrasound (56441)    Group Therapy (97555)     Estim Attended (99466)    Canalith Repositioning (40429)     Other:    Other:    Total Timed Code Tx Minutes 38 3       Total Treatment Minutes 38        Charge Justification:  (79538) THERAPEUTIC EXERCISE - Provided verbal/tactile cueing for activities related to strengthening, flexibility, endurance, ROM performed to prevent loss of range of motion, maintain or improve muscular strength or increase flexibility, following either an injury or surgery.   (03795) NEUROMUSCULAR RE-EDUCATION - Therapeutic procedure, 1 or more areas, each 15 minutes; neuromuscular reeducation of movement, balance, coordination, kinesthetic sense, posture, and/or proprioception for sitting and/or standing activities  (56379) THERAPEUTIC ACTIVITY - use of dynamic activities to improve functional performance. (Ex include squatting, ascending/descending stairs, walking, bending, lifting,

## 2024-08-28 ENCOUNTER — HOSPITAL ENCOUNTER (OUTPATIENT)
Dept: PHYSICAL THERAPY | Age: 84
Setting detail: THERAPIES SERIES
Discharge: HOME | End: 2024-08-28
Payer: MEDICARE

## 2024-08-28 PROCEDURE — 97110 THERAPEUTIC EXERCISES: CPT

## 2024-08-28 PROCEDURE — 97530 THERAPEUTIC ACTIVITIES: CPT

## 2024-08-28 NOTE — FLOWSHEET NOTE
[facility] - [facility] Therapy [address], [place] [address] office: [phone] fax: [phone]     Physical Therapy Discharge Summary    Dear Evelina Rizo APRN *  ,    We had the pleasure of treating the following patient for physical therapy services at [facility].  A summary of our findings can be found in the discharge summary below.  If you have any questions or concerns regarding these findings, please do not hesitate to contact me at the office phone number checked above.  Thank you for the referral.       Functional Outcome: LEFS: 34 % disability    Total Visits: 9     Plan of Care: Discharge from Physical Therapy    Reason for Discharge:  All Goals achieved  Patient made great functional improvements seen with increase right hip ROM. Now she is able to don her socks in sitting . Says she is more active with walking. Encouraged patient to participate in the local Silver sneaker programs around the [facility].          Physical Therapy: TREATMENT/PROGRESS NOTE   Patient: Kerri Alves (84 y.o. female)   Examination Date: 2024   :  1940 MRN: [id number]   Visit #: 9   Insurance Allowable Auth Needed   MN []Yes    [x]No    Insurance: Payor: AETNA MEDICARE / Plan: AETNA MEDICARE-ADVANTAGE PPO / Product Type: Medicare /   Insurance ID: [id number] - (Medicare Managed)  Secondary Insurance (if applicable):    Treatment Diagnosis:     ICD-10-CM    1. Hip weakness  R29.898       2. Decreased functional mobility and endurance  Z74.09       3. Imbalance  R26.89          Medical Diagnosis:  Chronic right hip pain [M25.551, G89.29]  History of femur fracture [Z87.81]  History of total right hip arthroplasty [Z96.641]   Referring Physician: Evelina Rizo APRN *  PCP: Evelina Rizo APRN - CNP     Plan of care signed (Y/N): YES    Date of Patient follow up with Physician:      Plan of Care Report: NO  POC update due:  2-3 sets - 10 reps  - Standing Hip Flexion with Resistance Loop  - 1 x daily - 7 x weekly - 2-3 sets - 10 reps  - Hip Extension with Resistance Loop  - 1 x daily - 7 x weekly - 2-3 sets - 10 reps    URL: [email or web address]  Date: 07/31/2024  Prepared by: Zachary Sun    Exercises  - Supine Bridge  - 1 x daily - 7 x weekly - 2 sets - 10 reps  - Clamshell  - 1 x daily - 7 x weekly - 2 sets - 10 reps  - Standing Hamstring Stretch on Chair  - 1 x daily - 7 x weekly - 1 sets - 2 reps - 20s hold  - Standing Hip Flexor Stretch  - 1 x daily - 7 x weekly - 1 sets - 2 reps - 20s hold  - Hip Flexor Stretch on Step  - 1 x daily - 7 x weekly - 1 sets - 2 reps - 20s hold        ASSESSMENT     Today's Assessment: See above    Medical Necessity Documentation:  I certify that this patient meets the below criteria necessary for medical necessity for care and/or justification of therapy services:  The patient has functional impairments and/or activity limitations and would benefit from continued outpatient therapy services to address the deficits outlined in the patients goals    Return to Play: NA    Prognosis for POC: [x] Good [] Fair  [] Poor    Patient requires continued skilled intervention: [x] Yes  [] No      CHARGE CAPTURE     PT CHARGE GRID   CPT Code (TIMED) minutes # CPT Code (UNTIMED) #     Therex (93702)  23 2  EVAL:LOW (33783 - Typically 20 minutes face-to-face)     Neuromusc. Re-ed (61087)    Re-Eval (67752)     Manual (20392)    Estim Unattended (56446)     Ther. Act (27348) 15 1  [facility] (51499)     Gait (10906)    Dry Needle 1-2 muscle (22170)     Aquatic Therex (63291)    Dry Needle 3+ muscle (20561)     Iontophoresis (15568)    VASO (93223)     Ultrasound (48185)    Group Therapy (25125)     Estim Attended (37811)    Canalith Repositioning (49043)     Other:    Other:    Total Timed Code Tx Minutes 38 3       Total Treatment Minutes 38        Charge Justification:  (61104) THERAPEUTIC EXERCISE -

## 2024-12-07 ENCOUNTER — OFFICE VISIT (OUTPATIENT)
Age: 84
End: 2024-12-07

## 2024-12-07 VITALS
HEIGHT: 63 IN | HEART RATE: 76 BPM | DIASTOLIC BLOOD PRESSURE: 71 MMHG | OXYGEN SATURATION: 95 % | BODY MASS INDEX: 21.92 KG/M2 | TEMPERATURE: 97.4 F | WEIGHT: 123.7 LBS | SYSTOLIC BLOOD PRESSURE: 123 MMHG

## 2024-12-07 DIAGNOSIS — J01.00 ACUTE NON-RECURRENT MAXILLARY SINUSITIS: Primary | ICD-10-CM

## 2024-12-07 RX ORDER — BENZONATATE 100 MG/1
100 CAPSULE ORAL 3 TIMES DAILY PRN
Qty: 30 CAPSULE | Refills: 0 | Status: SHIPPED | OUTPATIENT
Start: 2024-12-07 | End: 2024-12-17

## 2024-12-07 ASSESSMENT — ENCOUNTER SYMPTOMS
SORE THROAT: 0
CHEST TIGHTNESS: 0
COUGH: 1
SINUS PAIN: 1
EYES NEGATIVE: 1
WHEEZING: 0
SINUS PRESSURE: 1
CHOKING: 0
RHINORRHEA: 1
TROUBLE SWALLOWING: 0
FACIAL SWELLING: 1

## 2024-12-07 NOTE — PROGRESS NOTES
Kerri Alves (:  1940) is a 84 y.o. female,New patient, here for evaluation of the following chief complaint(s):  Cold Symptoms (Cough, nasal drainage and congestion for three weeks. )      ASSESSMENT/PLAN:    ICD-10-CM    1. Acute non-recurrent maxillary sinusitis  J01.00 amoxicillin-clavulanate (AUGMENTIN) 875-125 MG per tablet     benzonatate (TESSALON) 100 MG capsule          Patient is a 84-year-old not on really any medications presents with a 3-week history of a cough and sinus congestion.  Patient has continued to have rhinorrhea.  No fever at this time.  Patient's cough now is nonproductive.  Exam is reassuring.  We do not have an x-ray tech at this time is unable to get a chest x-ray but I suspect patient has a sinus infection lasting about 3 weeks or more.  Patient will be placed on Augmentin and Tessalon Perles for the cough.  I have suspected probably started as a virus but now has progressed to a bacterial infection.  Patient will be placed on the Augmentin she is not allergic to penicillin.  Patient understands instructions and will follow-up.    Follow up in 7 days if symptoms persist or if symptoms worsen.    SUBJECTIVE/OBJECTIVE:  Patient is a healthy 84-year-old with 3-week history of a congestion and a mild cough.  Patient thought it was a cold but it continues.  There has been no fever.  There has been some greenish discharge but it is fairly clear now.  Patient has been going to a chiropractor which has helped massage the sinuses.  Patient's blood pressure slightly elevated on presentation.  Patient denies any chest pain.  Patient does have history of high cholesterol but stopped taking her medication.  Patient states she is a vegan.  She takes lots of vitamins.  Otherwise she is not on any medication at this time.  Patient is concerned of this cough that will not go away.            Vitals:    24 1101 24 1118   BP: (!) 147/73 123/71   Site: Right Upper Arm Right

## 2024-12-07 NOTE — PATIENT INSTRUCTIONS
Take Tessalon Perles and Augmentin as directed.  Take it for the full 10 days as directed.  You may follow-up with your chiropractor and your family doctor.